# Patient Record
Sex: MALE | Race: WHITE | Employment: OTHER | URBAN - METROPOLITAN AREA
[De-identification: names, ages, dates, MRNs, and addresses within clinical notes are randomized per-mention and may not be internally consistent; named-entity substitution may affect disease eponyms.]

---

## 2018-03-23 ENCOUNTER — APPOINTMENT (OUTPATIENT)
Dept: OTHER | Facility: HOSPITAL | Age: 63
End: 2018-03-23
Attending: ORTHOPAEDIC SURGERY

## 2019-03-06 ENCOUNTER — OFFICE VISIT (OUTPATIENT)
Dept: FAMILY MEDICINE CLINIC | Facility: CLINIC | Age: 64
End: 2019-03-06
Payer: COMMERCIAL

## 2019-03-06 VITALS
DIASTOLIC BLOOD PRESSURE: 80 MMHG | SYSTOLIC BLOOD PRESSURE: 152 MMHG | BODY MASS INDEX: 28.14 KG/M2 | HEIGHT: 71 IN | OXYGEN SATURATION: 97 % | WEIGHT: 201 LBS | HEART RATE: 78 BPM

## 2019-03-06 DIAGNOSIS — Z01.89 ENCOUNTER FOR ROUTINE LABORATORY TESTING: ICD-10-CM

## 2019-03-06 DIAGNOSIS — Z12.5 ENCOUNTER FOR PROSTATE CANCER SCREENING: ICD-10-CM

## 2019-03-06 DIAGNOSIS — I10 ESSENTIAL HYPERTENSION: Primary | ICD-10-CM

## 2019-03-06 PROCEDURE — 99202 OFFICE O/P NEW SF 15 MIN: CPT

## 2019-03-06 RX ORDER — LISINOPRIL 10 MG/1
10 TABLET ORAL DAILY
Qty: 30 TABLET | Refills: 0 | Status: SHIPPED | OUTPATIENT
Start: 2019-03-06 | End: 2019-04-04

## 2019-03-08 PROBLEM — I10 ESSENTIAL HYPERTENSION: Status: ACTIVE | Noted: 2019-03-08

## 2019-03-09 NOTE — PATIENT INSTRUCTIONS
Uncontrolled High Blood Pressure (Established)    Your blood pressure was unusually high today. This can occur if you’ve missed doses of your blood pressure medicine. Or it can happen if you are taking other medicines.  These include some asthma inhalers, It’s important to take steps to lower your blood pressure. If you are taking blood pressure medicine, the guidelines below may help you need less or no medicines in the future. · Start a weight-loss program if you are overweight.   · Cut back on the amount · Consider buying an automatic blood pressure machine. Your provider may recommend a certain type. You can get one of these at most pharmacies. Measure your blood pressure twice a day, in the morning, and in the late afternoon.  Keep a written record of you Regular visits to your own healthcare provider for blood pressure and medicine checks are an important part of your care. Make a follow-up appointment as directed. Bring the record of your home blood pressure readings to the appointment.   When to seek medi

## 2019-03-09 NOTE — PROGRESS NOTES
HPI:    Patient ID: David Paris is a 70year old male. Blood Pressure   This is a new problem. The current episode started more than 1 year ago. The problem occurs intermittently. The problem has been waxing and waning.  Pertinent negatives include Effort normal and breath sounds normal. No respiratory distress. Neurological: He is alert and oriented to person, place, and time. Skin: Skin is warm. No rash noted. Nursing note and vitals reviewed. ASSESSMENT/PLAN:   1.  Essential hyper

## 2019-04-03 ENCOUNTER — APPOINTMENT (OUTPATIENT)
Dept: LAB | Facility: HOSPITAL | Age: 64
End: 2019-04-03
Payer: COMMERCIAL

## 2019-04-03 DIAGNOSIS — Z12.5 ENCOUNTER FOR PROSTATE CANCER SCREENING: ICD-10-CM

## 2019-04-03 DIAGNOSIS — Z01.89 ENCOUNTER FOR ROUTINE LABORATORY TESTING: ICD-10-CM

## 2019-04-03 PROCEDURE — 85027 COMPLETE CBC AUTOMATED: CPT

## 2019-04-03 PROCEDURE — 81003 URINALYSIS AUTO W/O SCOPE: CPT

## 2019-04-03 PROCEDURE — 80053 COMPREHEN METABOLIC PANEL: CPT

## 2019-04-03 PROCEDURE — 80061 LIPID PANEL: CPT

## 2019-04-03 PROCEDURE — 36415 COLL VENOUS BLD VENIPUNCTURE: CPT

## 2019-04-04 ENCOUNTER — OFFICE VISIT (OUTPATIENT)
Dept: FAMILY MEDICINE CLINIC | Facility: CLINIC | Age: 64
End: 2019-04-04
Payer: COMMERCIAL

## 2019-04-04 VITALS
WEIGHT: 205 LBS | OXYGEN SATURATION: 94 % | SYSTOLIC BLOOD PRESSURE: 146 MMHG | DIASTOLIC BLOOD PRESSURE: 82 MMHG | HEART RATE: 75 BPM | BODY MASS INDEX: 28.7 KG/M2 | RESPIRATION RATE: 18 BRPM | HEIGHT: 71 IN

## 2019-04-04 DIAGNOSIS — I10 ESSENTIAL HYPERTENSION: ICD-10-CM

## 2019-04-04 DIAGNOSIS — Z12.11 ENCOUNTER FOR SCREENING COLONOSCOPY: ICD-10-CM

## 2019-04-04 DIAGNOSIS — D69.6 THROMBOCYTOPENIA (HCC): ICD-10-CM

## 2019-04-04 DIAGNOSIS — E66.3 OVERWEIGHT (BMI 25.0-29.9): ICD-10-CM

## 2019-04-04 DIAGNOSIS — E78.00 HYPERCHOLESTEREMIA: ICD-10-CM

## 2019-04-04 DIAGNOSIS — Z00.01 ENCOUNTER FOR ROUTINE ADULT HEALTH EXAMINATION WITH ABNORMAL FINDINGS: Primary | ICD-10-CM

## 2019-04-04 DIAGNOSIS — R97.20 ELEVATED PSA MEASUREMENT: ICD-10-CM

## 2019-04-04 DIAGNOSIS — Z13.31 DEPRESSION SCREENING: ICD-10-CM

## 2019-04-04 PROCEDURE — 99397 PER PM REEVAL EST PAT 65+ YR: CPT

## 2019-04-04 RX ORDER — LISINOPRIL 20 MG/1
20 TABLET ORAL DAILY
Qty: 30 TABLET | Refills: 0 | Status: SHIPPED | OUTPATIENT
Start: 2019-04-04 | End: 2019-05-03

## 2019-04-05 NOTE — PROGRESS NOTES
CC: Annual Physical Exam    HPI:   Evelia Melendez is a 70year old male who presents for a complete physical exam. Pt denies any acute complaints at this time.     Wt Readings from Last 6 Encounters:  04/04/19 : 205 lb  03/06/19 : 201 lb    Body mass inde 8.0    Protein Urine Negative Negative mg/dL    Glucose Urine Negative Negative mg/dL    Ketones Urine Negative Negative mg/dL    Bilirubin Urine Negative Negative    Blood Urine Negative Negative    Nitrite Urine Negative Negative    Urobilinogen Urine <2 extremities,change in bowel or bladder control. HEMATOLOGIC:  Denies anemia, bleeding or bruising. LYMPHATICS:  Denies enlarged nodes or history of splenectomy. PSYCHIATRIC:  Denies depression or anxiety.   ENDOCRINOLOGIC:  Denies excessive sweating, col maintenance, will check: Orders Placed This Encounter      CBC, Platelet, No Differential [E]      Comp Metabolic Panel (14) [E]      Lipid Panel [E]      1. Encounter for routine adult health examination with abnormal findings  - Pt reassured and all ques

## 2019-04-06 PROBLEM — H91.90 HEARING LOSS: Status: ACTIVE | Noted: 2019-04-05

## 2019-04-30 DIAGNOSIS — I10 ESSENTIAL HYPERTENSION: ICD-10-CM

## 2019-04-30 RX ORDER — LISINOPRIL 20 MG/1
TABLET ORAL
Qty: 30 TABLET | Refills: 0 | OUTPATIENT
Start: 2019-04-30

## 2019-05-03 ENCOUNTER — OFFICE VISIT (OUTPATIENT)
Dept: FAMILY MEDICINE CLINIC | Facility: CLINIC | Age: 64
End: 2019-05-03
Payer: COMMERCIAL

## 2019-05-03 VITALS
WEIGHT: 203 LBS | HEART RATE: 64 BPM | BODY MASS INDEX: 28 KG/M2 | OXYGEN SATURATION: 95 % | SYSTOLIC BLOOD PRESSURE: 130 MMHG | DIASTOLIC BLOOD PRESSURE: 70 MMHG

## 2019-05-03 DIAGNOSIS — I10 ESSENTIAL HYPERTENSION: Primary | ICD-10-CM

## 2019-05-03 PROBLEM — Z13.31 DEPRESSION SCREENING: Status: RESOLVED | Noted: 2019-04-04 | Resolved: 2019-05-03

## 2019-05-03 PROBLEM — Z00.01 ENCOUNTER FOR ROUTINE ADULT HEALTH EXAMINATION WITH ABNORMAL FINDINGS: Status: RESOLVED | Noted: 2019-04-04 | Resolved: 2019-05-03

## 2019-05-03 PROCEDURE — 99213 OFFICE O/P EST LOW 20 MIN: CPT

## 2019-05-03 RX ORDER — LISINOPRIL 20 MG/1
20 TABLET ORAL DAILY
Qty: 90 TABLET | Refills: 0 | Status: SHIPPED | OUTPATIENT
Start: 2019-05-03 | End: 2019-08-02

## 2019-05-03 NOTE — PATIENT INSTRUCTIONS
Step-by-Step  Checking Your Blood Pressure    Date Last Reviewed: 4/27/2016  © 9122-1710 [employer]. 140[address] [facility], [address] [place] [address]. All rights reserved.  This information is not intended as a substitute for professional medical

## 2019-05-03 NOTE — PROGRESS NOTES
HPI:    Patient ID: Evelia Melendez is a 75year old male. Hypertension   This is a new problem. Episode onset: 2 months ago. The problem has been gradually improving since onset. The problem is controlled.  Pertinent negatives include no anxiety, blurr heart sounds. Pulmonary/Chest: Effort normal and breath sounds normal. No respiratory distress. Neurological: He is alert and oriented to person, place, and time. Skin: Skin is warm. No rash noted. Nursing note and vitals reviewed.              ASSE

## 2019-06-28 ENCOUNTER — OFFICE VISIT (OUTPATIENT)
Dept: SURGERY | Facility: CLINIC | Age: 64
End: 2019-06-28
Payer: COMMERCIAL

## 2019-06-28 VITALS
HEART RATE: 94 BPM | DIASTOLIC BLOOD PRESSURE: 84 MMHG | WEIGHT: 200 LBS | HEIGHT: 71 IN | SYSTOLIC BLOOD PRESSURE: 136 MMHG | RESPIRATION RATE: 16 BRPM | BODY MASS INDEX: 28 KG/M2

## 2019-06-28 DIAGNOSIS — N40.1 BENIGN PROSTATIC HYPERPLASIA WITH WEAK URINARY STREAM: ICD-10-CM

## 2019-06-28 DIAGNOSIS — R97.20 ELEVATED PSA: Primary | ICD-10-CM

## 2019-06-28 DIAGNOSIS — R39.12 BENIGN PROSTATIC HYPERPLASIA WITH WEAK URINARY STREAM: ICD-10-CM

## 2019-06-28 DIAGNOSIS — R39.12 WEAK URINARY STREAM: ICD-10-CM

## 2019-06-28 PROCEDURE — 99244 OFF/OP CNSLTJ NEW/EST MOD 40: CPT | Performed by: UROLOGY

## 2019-06-28 PROCEDURE — 99212 OFFICE O/P EST SF 10 MIN: CPT | Performed by: UROLOGY

## 2019-06-28 NOTE — PROGRESS NOTES
Amanda Roldan is a 75year old male. HPI:   Patient presents with:  elevated psa    History provided by patient. Referred by Dr. Bettyjo Bamberger. Elevated PSA  Problem started 4/3/2019 with PSA of 4.46.  Patient denies associated symptoms to suggest Medications (Active prior to today's visit):    Current Outpatient Medications:  lisinopril 20 MG Oral Tab Take 1 tablet (20 mg total) by mouth daily.  Disp: 90 tablet Rfl: 0       Allergies:  No Known Allergies      ROS:   Genitourinary:  Negative for Non-tender  BLADDER  Benign  Normal secondary sexual characteristics and normal pubic hair distribution. INGUINAL No hernias  TESTES  =  descended bilaterally without nodules and no orchitis or epididymitis.     EPIDIDYMIS  Unremarkable     SCROTUM Unrem Discussed starting long term dutasteride/Avodart or finasteride vs greenlight laser ablation of prostate under general anesthesia vs observation with patient including risks, benefits, and alternatives.  He indicates his understanding and decides to observe ordered in this encounter       Imaging & Referrals:  None     6/28/2019  Lloyd Chen        By signing my name below, I, Lloyd Chen,  attest that this documentation has been prepared under the direction and in the presence of Manny Ren

## 2019-06-28 NOTE — PATIENT INSTRUCTIONS
Mei Reece M.D.      1.  Visit in August 2019. Please get blood draw for PSA--total and free 1--10 days before visit. No sexual stimulation whatsoever for 5 days before the actual PSA blood draw.       2.   The heart hea

## 2019-06-29 PROBLEM — N40.0 BENIGN PROSTATIC HYPERPLASIA WITH ELEVATED PROSTATE SPECIFIC ANTIGEN (PSA): Status: ACTIVE | Noted: 2019-04-04

## 2019-06-29 PROBLEM — R39.12 BENIGN PROSTATIC HYPERPLASIA WITH WEAK URINARY STREAM: Status: ACTIVE | Noted: 2019-06-28

## 2019-06-29 PROBLEM — N40.1 BENIGN PROSTATIC HYPERPLASIA WITH WEAK URINARY STREAM: Status: ACTIVE | Noted: 2019-06-28

## 2019-06-29 PROBLEM — R97.20 BENIGN PROSTATIC HYPERPLASIA WITH ELEVATED PROSTATE SPECIFIC ANTIGEN (PSA): Status: ACTIVE | Noted: 2019-04-04

## 2019-07-28 DIAGNOSIS — I10 ESSENTIAL HYPERTENSION: ICD-10-CM

## 2019-07-29 RX ORDER — LISINOPRIL 20 MG/1
TABLET ORAL
Qty: 90 TABLET | Refills: 0 | OUTPATIENT
Start: 2019-07-29

## 2019-08-02 ENCOUNTER — OFFICE VISIT (OUTPATIENT)
Dept: FAMILY MEDICINE CLINIC | Facility: CLINIC | Age: 64
End: 2019-08-02
Payer: COMMERCIAL

## 2019-08-02 VITALS
WEIGHT: 199 LBS | BODY MASS INDEX: 27.86 KG/M2 | OXYGEN SATURATION: 98 % | DIASTOLIC BLOOD PRESSURE: 84 MMHG | SYSTOLIC BLOOD PRESSURE: 168 MMHG | HEART RATE: 82 BPM | HEIGHT: 71 IN

## 2019-08-02 DIAGNOSIS — I10 ESSENTIAL HYPERTENSION: Primary | ICD-10-CM

## 2019-08-02 PROCEDURE — 99213 OFFICE O/P EST LOW 20 MIN: CPT

## 2019-08-02 RX ORDER — LISINOPRIL 20 MG/1
20 TABLET ORAL DAILY
Qty: 30 TABLET | Refills: 0 | Status: SHIPPED | OUTPATIENT
Start: 2019-08-02 | End: 2019-08-28

## 2019-08-03 NOTE — PROGRESS NOTES
HPI:    Patient ID: Yu Kyle is a 75year old male. Hypertension   This is a chronic problem. Episode onset: few months ago. The problem has been waxing and waning since onset. The problem is uncontrolled.  Pertinent negatives include no anxiety, Pulmonary/Chest: Effort normal and breath sounds normal. No respiratory distress. Neurological: He is alert and oriented to person, place, and time. Skin: Skin is warm. No rash noted. Nursing note and vitals reviewed.              ASSESSMENT/PLAN:

## 2019-08-20 ENCOUNTER — LAB ENCOUNTER (OUTPATIENT)
Dept: LAB | Facility: HOSPITAL | Age: 64
End: 2019-08-20
Payer: COMMERCIAL

## 2019-08-20 DIAGNOSIS — E78.00 HYPERCHOLESTEREMIA: ICD-10-CM

## 2019-08-20 DIAGNOSIS — D69.6 THROMBOCYTOPENIA (HCC): ICD-10-CM

## 2019-08-20 DIAGNOSIS — R97.20 ELEVATED PSA: ICD-10-CM

## 2019-08-20 LAB
ALBUMIN SERPL-MCNC: 4.2 G/DL (ref 3.4–5)
ALBUMIN/GLOB SERPL: 1.2 {RATIO} (ref 1–2)
ALP LIVER SERPL-CCNC: 73 U/L (ref 45–117)
ALT SERPL-CCNC: 63 U/L (ref 16–61)
ANION GAP SERPL CALC-SCNC: 5 MMOL/L (ref 0–18)
AST SERPL-CCNC: 40 U/L (ref 15–37)
BILIRUB SERPL-MCNC: 0.9 MG/DL (ref 0.1–2)
BUN BLD-MCNC: 15 MG/DL (ref 7–18)
BUN/CREAT SERPL: 14.3 (ref 10–20)
CALCIUM BLD-MCNC: 9.4 MG/DL (ref 8.5–10.1)
CHLORIDE SERPL-SCNC: 108 MMOL/L (ref 98–112)
CHOLEST SMN-MCNC: 249 MG/DL (ref ?–200)
CO2 SERPL-SCNC: 27 MMOL/L (ref 21–32)
CREAT BLD-MCNC: 1.05 MG/DL (ref 0.7–1.3)
DEPRECATED RDW RBC AUTO: 46.3 FL (ref 35.1–46.3)
ERYTHROCYTE [DISTWIDTH] IN BLOOD BY AUTOMATED COUNT: 12.4 % (ref 11–15)
GLOBULIN PLAS-MCNC: 3.6 G/DL (ref 2.8–4.4)
GLUCOSE BLD-MCNC: 97 MG/DL (ref 70–99)
HCT VFR BLD AUTO: 41.8 % (ref 39–53)
HDLC SERPL-MCNC: 81 MG/DL (ref 40–59)
HGB BLD-MCNC: 13.6 G/DL (ref 13–17.5)
LDLC SERPL CALC-MCNC: 147 MG/DL (ref ?–100)
M PROTEIN MFR SERPL ELPH: 7.8 G/DL (ref 6.4–8.2)
MCH RBC QN AUTO: 32.7 PG (ref 26–34)
MCHC RBC AUTO-ENTMCNC: 32.5 G/DL (ref 31–37)
MCV RBC AUTO: 100.5 FL (ref 80–100)
NONHDLC SERPL-MCNC: 168 MG/DL (ref ?–130)
OSMOLALITY SERPL CALC.SUM OF ELEC: 291 MOSM/KG (ref 275–295)
PATIENT FASTING: YES
PATIENT FASTING: YES
PLATELET # BLD AUTO: 159 10(3)UL (ref 150–450)
POTASSIUM SERPL-SCNC: 4.6 MMOL/L (ref 3.5–5.1)
PSA FREE MFR SERPL: 9 %
PSA FREE SERPL-MCNC: 0.52 NG/ML
PSA SERPL-MCNC: 5.81 NG/ML (ref ?–4)
RBC # BLD AUTO: 4.16 X10(6)UL (ref 3.8–5.8)
SODIUM SERPL-SCNC: 140 MMOL/L (ref 136–145)
TRIGL SERPL-MCNC: 105 MG/DL (ref 30–149)
VLDLC SERPL CALC-MCNC: 21 MG/DL (ref 0–30)
WBC # BLD AUTO: 5.1 X10(3) UL (ref 4–11)

## 2019-08-20 PROCEDURE — 80061 LIPID PANEL: CPT

## 2019-08-20 PROCEDURE — 85027 COMPLETE CBC AUTOMATED: CPT

## 2019-08-20 PROCEDURE — 84153 ASSAY OF PSA TOTAL: CPT

## 2019-08-20 PROCEDURE — 36415 COLL VENOUS BLD VENIPUNCTURE: CPT

## 2019-08-20 PROCEDURE — 84154 ASSAY OF PSA FREE: CPT

## 2019-08-20 PROCEDURE — 80053 COMPREHEN METABOLIC PANEL: CPT

## 2019-08-23 ENCOUNTER — OFFICE VISIT (OUTPATIENT)
Dept: SURGERY | Facility: CLINIC | Age: 64
End: 2019-08-23
Payer: COMMERCIAL

## 2019-08-23 VITALS
HEIGHT: 71 IN | WEIGHT: 198 LBS | RESPIRATION RATE: 17 BRPM | HEART RATE: 73 BPM | DIASTOLIC BLOOD PRESSURE: 90 MMHG | TEMPERATURE: 98 F | SYSTOLIC BLOOD PRESSURE: 177 MMHG | BODY MASS INDEX: 27.72 KG/M2

## 2019-08-23 DIAGNOSIS — R97.20 ELEVATED PSA: Primary | ICD-10-CM

## 2019-08-23 DIAGNOSIS — R39.12 BENIGN PROSTATIC HYPERPLASIA WITH WEAK URINARY STREAM: ICD-10-CM

## 2019-08-23 DIAGNOSIS — R39.12 WEAK URINARY STREAM: ICD-10-CM

## 2019-08-23 DIAGNOSIS — N40.1 BENIGN PROSTATIC HYPERPLASIA WITH WEAK URINARY STREAM: ICD-10-CM

## 2019-08-23 PROCEDURE — 99214 OFFICE O/P EST MOD 30 MIN: CPT | Performed by: UROLOGY

## 2019-08-23 RX ORDER — CIPROFLOXACIN 500 MG/1
TABLET, FILM COATED ORAL
Qty: 6 TABLET | Refills: 0 | Status: SHIPPED | OUTPATIENT
Start: 2019-08-23 | End: 2019-08-28 | Stop reason: ALTCHOICE

## 2019-08-23 RX ORDER — CEFDINIR 300 MG/1
300 CAPSULE ORAL EVERY 12 HOURS
Qty: 6 CAPSULE | Refills: 0 | Status: SHIPPED | OUTPATIENT
Start: 2019-08-23 | End: 2019-08-26

## 2019-08-23 NOTE — PROGRESS NOTES
HPI:    Patient ID: Kath Patel is a 75year old male. HPI  Elevated PSA  Chronic. Problem started 4/3/2019 with PSA of 4.46.  Patient denies associated symptoms to suggest prostate cancer such as weight loss or loss of appetite or bone pain and den Negative for chest tightness and shortness of breath. Cardiovascular: Negative for chest pain. Gastrointestinal: Negative for abdominal pain and constipation. Genitourinary: Negative for dysuria, flank pain and hematuria (Gross).         Positive for month, how many times per night did you most typically get up to urinate from the time you went to bed at night until the time you got up in the morning?: Not at all  Total Symptom Score: 1  If you were to spend the rest of your life with your urinary cond right side. In light of the available information, I fully explained to patient the benefits, risks, complications, side effects, reasons for, nature of, alternatives of trans-rectal ultrasound guided prostate biopsy vs re-evaluation with repeat PSA.  I ans start  IN THE MORNING ON THE DAY BEFORE THE BIOPSY and continue it until you finish it    7. You may eat breakfast and lunch on the day of the biopsy because the biopsy will be performed under local anesthesia and you are not being put to sleep.       No o

## 2019-08-23 NOTE — PATIENT INSTRUCTIONS
Lily Grady M.D.      1.  Proceed with plans for ultrasound-guided needle biopsy of prostate in the office    2.    Stop aspirin 10  days before biopsy and NSAIDs such as Motrin, Advil, Aleve, naproxen, ibuprofen  7   days

## 2019-08-28 ENCOUNTER — OFFICE VISIT (OUTPATIENT)
Dept: FAMILY MEDICINE CLINIC | Facility: CLINIC | Age: 64
End: 2019-08-28
Payer: COMMERCIAL

## 2019-08-28 VITALS
BODY MASS INDEX: 28 KG/M2 | DIASTOLIC BLOOD PRESSURE: 72 MMHG | OXYGEN SATURATION: 96 % | SYSTOLIC BLOOD PRESSURE: 144 MMHG | HEART RATE: 72 BPM | WEIGHT: 203 LBS

## 2019-08-28 DIAGNOSIS — I10 ESSENTIAL HYPERTENSION: ICD-10-CM

## 2019-08-28 PROCEDURE — 99213 OFFICE O/P EST LOW 20 MIN: CPT

## 2019-08-28 RX ORDER — LISINOPRIL 30 MG/1
30 TABLET ORAL DAILY
Qty: 30 TABLET | Refills: 0 | Status: SHIPPED | OUTPATIENT
Start: 2019-08-28 | End: 2019-09-23

## 2019-08-30 NOTE — PROGRESS NOTES
HPI:    Patient ID: Bessy Patel is a 75year old male. Hypertension   This is a chronic problem. Episode onset: few months ago. The problem has been waxing and waning since onset. The problem is uncontrolled.  Pertinent negatives include no anxiety, normal and breath sounds normal. No respiratory distress. Neurological: He is alert and oriented to person, place, and time. Skin: Skin is warm. No rash noted. Nursing note and vitals reviewed. ASSESSMENT/PLAN:   1.  Essential hypertension

## 2019-08-30 NOTE — PATIENT INSTRUCTIONS
Exercise for a Healthier Heart     Exercise with a friend. When activity is fun, you're more likely to stick with it. You may wonder how you can improve the health of your heart. If you’re thinking about exercise, you’re on the right track.  You don’t n Choose one or more activities you enjoy. Walking is one of the easiest things you can do. You can also try swimming, riding a bike, dancing, or taking an exercise class.   Stop exercising and call your doctor if you:  · Have chest pain or feel dizzy or ligh

## 2019-09-23 ENCOUNTER — OFFICE VISIT (OUTPATIENT)
Dept: FAMILY MEDICINE CLINIC | Facility: CLINIC | Age: 64
End: 2019-09-23
Payer: COMMERCIAL

## 2019-09-23 VITALS
SYSTOLIC BLOOD PRESSURE: 134 MMHG | WEIGHT: 198 LBS | HEART RATE: 68 BPM | BODY MASS INDEX: 28 KG/M2 | DIASTOLIC BLOOD PRESSURE: 74 MMHG | OXYGEN SATURATION: 96 %

## 2019-09-23 DIAGNOSIS — I10 ESSENTIAL HYPERTENSION: Primary | ICD-10-CM

## 2019-09-23 DIAGNOSIS — I10 ESSENTIAL HYPERTENSION: ICD-10-CM

## 2019-09-23 DIAGNOSIS — E78.00 HYPERCHOLESTEREMIA: ICD-10-CM

## 2019-09-23 PROCEDURE — 99214 OFFICE O/P EST MOD 30 MIN: CPT

## 2019-09-23 RX ORDER — SIMVASTATIN 20 MG
20 TABLET ORAL NIGHTLY
Qty: 90 TABLET | Refills: 0 | Status: SHIPPED | OUTPATIENT
Start: 2019-09-23 | End: 2019-12-09

## 2019-09-23 RX ORDER — LISINOPRIL 30 MG/1
30 TABLET ORAL DAILY
Qty: 90 TABLET | Refills: 0 | Status: SHIPPED | OUTPATIENT
Start: 2019-09-23 | End: 2019-12-09

## 2019-09-23 NOTE — PATIENT INSTRUCTIONS
Lifestyle Changes to Control Cholesterol  You can control your cholesterol through diet, exercise, weight management, quitting smoking, stress management, and taking your medicines right. These things can also lower your risk for cardiovascular disease. · Riding a bicycle or stationary bike  · Dancing  Managing your weight  If you are overweight or obese, your healthcare provider will work with you to help you lose weight and lower your BMI (body mass index).  Making diet changes and getting more physical · Don’t skip a dose or stop taking your medicine because you feel better or because your cholesterol numbers go down. Never stop taking your medicine unless your healthcare provider has told you it’s OK.   · Ask your healthcare provider if you have any ques © 2742-0639 [employer]. 140[address] [facility] [place], [facility] [place]. All rights reserved. This information is not intended as a substitute for professional medical care. Always follow your healthcare professional's instructions.

## 2019-09-23 NOTE — PROGRESS NOTES
HPI:    Patient ID: Kalli Yeboah is a 75year old male. Hypertension   This is a chronic problem. Episode onset: few months ago. The problem has been waxing and waning since onset. The problem is controlled.  Pertinent negatives include no anxiety, b weakness, syncope, weakness, light-headedness, numbness and headaches. All other systems reviewed and are negative. Current Outpatient Medications:  simvastatin 20 MG Oral Tab Take 1 tablet (20 mg total) by mouth nightly.  Disp: 90 tablet Rfl:

## 2019-09-25 RX ORDER — LISINOPRIL 30 MG/1
TABLET ORAL
Qty: 30 TABLET | Refills: 0 | OUTPATIENT
Start: 2019-09-25

## 2019-09-30 ENCOUNTER — OFFICE VISIT (OUTPATIENT)
Dept: SURGERY | Facility: CLINIC | Age: 64
End: 2019-09-30
Payer: COMMERCIAL

## 2019-09-30 VITALS
BODY MASS INDEX: 27.72 KG/M2 | HEIGHT: 71 IN | HEART RATE: 65 BPM | RESPIRATION RATE: 16 BRPM | WEIGHT: 198 LBS | TEMPERATURE: 99 F | DIASTOLIC BLOOD PRESSURE: 80 MMHG | SYSTOLIC BLOOD PRESSURE: 126 MMHG

## 2019-09-30 DIAGNOSIS — R97.20 ELEVATED PROSTATE SPECIFIC ANTIGEN (PSA): ICD-10-CM

## 2019-09-30 DIAGNOSIS — R97.20 ELEVATED PSA: Primary | ICD-10-CM

## 2019-09-30 PROCEDURE — 76872 US TRANSRECTAL: CPT | Performed by: UROLOGY

## 2019-09-30 PROCEDURE — 55700 BIOPSY OF PROSTATE,NEEDLE/PUNCH: CPT | Performed by: UROLOGY

## 2019-09-30 NOTE — PROGRESS NOTES
26 Jing Garcia Patient Status:  Outpatient    1955 MRN [id number]   Location [address] [place] [address] Attending [facility] Day # 0 PCP MD Kaykay Ballesteros is a 75 y under ultrasound guidance.  Using a 7\"22 gauge spinal needle and using ultrasound guidance, I injected  1% xylocaine solution in each of the following 4 locations: Junction of the prostate and seminal vesicle at the right base; junction of the prostate and evening. If you do not hear from us within 5-6 days, please call office and leave message that you are calling for results.   I will be away from the office for 13 days; our nurse practitioner Zuly Thakkar and urologist Dr. Naima Villafuerte and Dr. April Rizo  mark

## 2019-09-30 NOTE — PATIENT INSTRUCTIONS
Rahda Mora M.D.      1. Finish your prescribed 3 day course of the 2 different antibiotics as directed. 2. No heavy lifting or strenuous physical activity for a few days. 3. No aspirin or NSAIDs for 24 hours.     4

## 2019-10-02 ENCOUNTER — TELEPHONE (OUTPATIENT)
Dept: SURGERY | Facility: CLINIC | Age: 64
End: 2019-10-02

## 2019-10-02 NOTE — TELEPHONE ENCOUNTER
Patient called back. I discussed his pathology results with him. He verbalized understanding and was thankful. I recommended follow up with SILVINA in 4 weeks. He was agreeable.     Staff, can we please schedule this patient for a cancer discussion with SILVINA

## 2019-10-02 NOTE — TELEPHONE ENCOUNTER
Attempted to call patient to discuss prostate biopsy done by ALLISON PHILIPPE. If calls back please transfer to [id number].

## 2019-10-07 NOTE — TELEPHONE ENCOUNTER
Called and LVM for patient to call us back to schedule appointment with PVK. Patient tentatively scheduled with PVK on 11/7/19 at 1:40 pm for 40 minutes.  ANILA's when patient call us back please offer 11/7/19 at 1:40 pm.

## 2019-10-13 NOTE — TELEPHONE ENCOUNTER
Johnnie Check and Andie Cesar,  This patient positive prostate cancer pathology; to address this, I need to see him this month instead of November 7; please try to get him in to see me on Thursday this month if no other way.   Likewise, please notify patient th

## 2019-10-16 NOTE — TELEPHONE ENCOUNTER
TERESATCB. I placed pt on the schd for PVK for a 40 min cancer disc. On Thurs. 10/24 at 11:20 am. When pt calls back please confirm that he can accept that appt and if not send us back a msg.

## 2019-10-23 ENCOUNTER — TELEPHONE (OUTPATIENT)
Dept: SURGERY | Facility: CLINIC | Age: 64
End: 2019-10-23

## 2019-10-23 NOTE — TELEPHONE ENCOUNTER
I was asked by my supervisor to call pt and ask him if there was any way he would be able to come in at 4 pm for the cancer discussion since we may not be able to find another 40 min slot for a while.      I called pt and asked him if there was any way poss

## 2019-10-23 NOTE — TELEPHONE ENCOUNTER
Pt called stating pt is scheduled for appointment 10-24-19 at 11:20am for cancer discussion. Pt received a message appointment time will need to be changed to 4:00pm. Pt has to work. Pt is unavailable.   Pt is available before 2:45 pm.   Please call pt t

## 2019-10-24 NOTE — TELEPHONE ENCOUNTER
Pt called back and states he wont be able to come in the office today at 4pm. Pt requesting to speak with RN in regards to a different appt.

## 2019-10-24 NOTE — TELEPHONE ENCOUNTER
Spoke with pt and informed him that we had a cxl appt for Monday 10/28 at 1:20 pm and asked if he could take that appt and pt agreed and was very thankful. I placed him on the schd.

## 2019-10-28 ENCOUNTER — OFFICE VISIT (OUTPATIENT)
Dept: SURGERY | Facility: CLINIC | Age: 64
End: 2019-10-28
Payer: MEDICARE

## 2019-10-28 VITALS
SYSTOLIC BLOOD PRESSURE: 155 MMHG | HEIGHT: 71 IN | HEART RATE: 60 BPM | WEIGHT: 190 LBS | TEMPERATURE: 98 F | BODY MASS INDEX: 26.6 KG/M2 | RESPIRATION RATE: 16 BRPM | DIASTOLIC BLOOD PRESSURE: 85 MMHG

## 2019-10-28 DIAGNOSIS — R39.12 BENIGN PROSTATIC HYPERPLASIA WITH WEAK URINARY STREAM: ICD-10-CM

## 2019-10-28 DIAGNOSIS — C61 PROSTATE CANCER (HCC): Primary | ICD-10-CM

## 2019-10-28 DIAGNOSIS — R35.1 NOCTURIA: ICD-10-CM

## 2019-10-28 DIAGNOSIS — N40.1 BENIGN PROSTATIC HYPERPLASIA WITH WEAK URINARY STREAM: ICD-10-CM

## 2019-10-28 PROCEDURE — 99214 OFFICE O/P EST MOD 30 MIN: CPT | Performed by: UROLOGY

## 2019-10-28 PROCEDURE — 99354 PROLONGED SERV,OFFICE,1ST HR: CPT | Performed by: UROLOGY

## 2019-10-28 PROCEDURE — G0463 HOSPITAL OUTPT CLINIC VISIT: HCPCS | Performed by: UROLOGY

## 2019-10-28 NOTE — PATIENT INSTRUCTIONS
Elvis Honeycutt M.D.      1.   Please schedule 3 T MRI of the prostate--to reconfirm that there is no evidence of spread of cancer outside your prostate; as of today, please wait at least 2 weeks before you have the 3T MRI per

## 2019-10-28 NOTE — PROGRESS NOTES
Urology visit                                     75 year-old male comes to the office by himself. He wants to be evaluated for ---    1. Newly diagnosed prostate cancer  2. Voiding difficulties  3. Enlarged prostate  4. Erectile dysfunction  5.    m smoked for 8-10 years with a pack or less a day--8 year pack history. Patient is a  and has been exposed to fumes for this. Family History  His father had an unspecified cancer and congestive heart failure, he  at 80.  His mother has breast [place] score 3+4=7 (Grade Group 2), involving one out of three tissue cores and approximately 10% of entire specimen. Perineural invasion is not identified.  Other regions negative      LABORATORIES    8/20/2019 PSA = 5.81, 9% free PSA (58% probability o possible fatal complications, possibility of a heart attack or stroke), complications, side effects, reasons for, nature of, alternatives to the open or robotic radical prostatectomy, ultrasound guided placement of radioactive implants into the prostate, s associate Dr. Angel Weinberg to further explore possibility of robotic radical prostatectomy; ideally see him after the MRI but you could also make an appointment to see him before the MRI    3.    If you would like another opinion on external beam radiati

## 2019-11-01 ENCOUNTER — TELEPHONE (OUTPATIENT)
Dept: SURGERY | Facility: CLINIC | Age: 64
End: 2019-11-01

## 2019-11-01 NOTE — TELEPHONE ENCOUNTER
Per Chris Reddy, calling to request clarification on order of MRI Prostate. Asking if test needs to be done two weeks from order date or two weeks from biopsy?

## 2019-11-15 ENCOUNTER — HOSPITAL ENCOUNTER (OUTPATIENT)
Dept: MRI IMAGING | Facility: HOSPITAL | Age: 64
Discharge: HOME | End: 2019-11-15
Attending: UROLOGY
Payer: MEDICARE

## 2019-11-15 DIAGNOSIS — C61 PROSTATE CANCER (HCC): ICD-10-CM

## 2019-11-15 PROCEDURE — A9575 INJ GADOTERATE MEGLUMI 0.1ML: HCPCS | Performed by: UROLOGY

## 2019-11-15 PROCEDURE — 82565 ASSAY OF CREATININE: CPT

## 2019-11-15 PROCEDURE — 72197 MRI PELVIS W/O & W/DYE: CPT | Performed by: UROLOGY

## 2019-11-17 DIAGNOSIS — C79.51 BONE METASTASIS (HCC): ICD-10-CM

## 2019-11-17 DIAGNOSIS — R93.5 ABNORMAL MRI, PELVIS: ICD-10-CM

## 2019-11-17 DIAGNOSIS — M89.8X5 LYTIC BONE LESION OF HIP: Primary | ICD-10-CM

## 2019-11-17 DIAGNOSIS — C61 PROSTATE CANCER (HCC): ICD-10-CM

## 2019-11-19 ENCOUNTER — TELEPHONE (OUTPATIENT)
Dept: SURGERY | Facility: CLINIC | Age: 64
End: 2019-11-19

## 2019-11-19 DIAGNOSIS — C61 PROSTATE CANCER (HCC): Primary | ICD-10-CM

## 2019-11-19 NOTE — TELEPHONE ENCOUNTER
Detailed message left on pts number (HIPPA verified) informing order for bone scan has been signed. Central scheduling phone number provided as well as office number if any further questions.

## 2019-11-19 NOTE — TELEPHONE ENCOUNTER
Dr. Danette Ying, spoke to pt and relayed your message as shown below regarding MRI of prostate. Noted that Bone scan was not ordered. Pended order for you to review and sign off. I will then call pt informing this order has been placed.  Pt aware he can go ah

## 2019-11-19 NOTE — TELEPHONE ENCOUNTER
----- Message from Colan Homans, MD sent at 11/17/2019 11:54 PM CST -----  Nurses, please call Roula Liu.   MRI of the prostate shows lesion on the right side of the prostate and the lesion is suspicious for cancer; we know from the biopsy that cancer was

## 2019-11-27 ENCOUNTER — HOSPITAL ENCOUNTER (OUTPATIENT)
Dept: GENERAL RADIOLOGY | Facility: HOSPITAL | Age: 64
Discharge: HOME | End: 2019-11-27
Attending: UROLOGY
Payer: MEDICARE

## 2019-11-27 ENCOUNTER — APPOINTMENT (OUTPATIENT)
Dept: LAB | Facility: HOSPITAL | Age: 64
End: 2019-11-27
Attending: UROLOGY
Payer: MEDICARE

## 2019-11-27 ENCOUNTER — HOSPITAL ENCOUNTER (OUTPATIENT)
Dept: NUCLEAR MEDICINE | Facility: HOSPITAL | Age: 64
Discharge: HOME | End: 2019-11-27
Attending: UROLOGY
Payer: MEDICARE

## 2019-11-27 DIAGNOSIS — M25.552 HIP PAIN, ACUTE, LEFT: ICD-10-CM

## 2019-11-27 DIAGNOSIS — C79.51 BONE METASTASIS (HCC): ICD-10-CM

## 2019-11-27 DIAGNOSIS — C61 PROSTATE CANCER (HCC): ICD-10-CM

## 2019-11-27 PROCEDURE — 36415 COLL VENOUS BLD VENIPUNCTURE: CPT

## 2019-11-27 PROCEDURE — 78306 BONE IMAGING WHOLE BODY: CPT | Performed by: UROLOGY

## 2019-11-27 PROCEDURE — 73502 X-RAY EXAM HIP UNI 2-3 VIEWS: CPT | Performed by: UROLOGY

## 2019-11-27 PROCEDURE — 80076 HEPATIC FUNCTION PANEL: CPT

## 2019-12-04 ENCOUNTER — OFFICE VISIT (OUTPATIENT)
Dept: SURGERY | Facility: CLINIC | Age: 64
End: 2019-12-04
Payer: MEDICARE

## 2019-12-04 VITALS
BODY MASS INDEX: 26.6 KG/M2 | TEMPERATURE: 98 F | HEART RATE: 54 BPM | WEIGHT: 190 LBS | DIASTOLIC BLOOD PRESSURE: 88 MMHG | HEIGHT: 71 IN | SYSTOLIC BLOOD PRESSURE: 159 MMHG

## 2019-12-04 DIAGNOSIS — C61 PROSTATE CANCER (HCC): Primary | ICD-10-CM

## 2019-12-04 DIAGNOSIS — Z01.818 PREOP EXAMINATION: ICD-10-CM

## 2019-12-04 DIAGNOSIS — R39.9 SYMPTOM INVOLVING BLADDER: ICD-10-CM

## 2019-12-04 PROCEDURE — G0463 HOSPITAL OUTPT CLINIC VISIT: HCPCS | Performed by: UROLOGY

## 2019-12-04 PROCEDURE — 99214 OFFICE O/P EST MOD 30 MIN: CPT | Performed by: UROLOGY

## 2019-12-04 PROCEDURE — 99354 PROLONGED SERV,OFFICE,1ST HR: CPT | Performed by: UROLOGY

## 2019-12-04 NOTE — PROGRESS NOTES
[facility], [facility] Urologic Oncology  Initial Office Consultation    HPI:   Jeanine Andrade is a 75year old male here today for consultation at the request of, and a copy of this note will be sent to, Suly Márquez MD and Chencho Berg MD regarding an assisted radical prostatectomy for treatment of his prostate cancer. PMH: HTN, HLD, thrombocytopenia, recently diagnosed prostate cancer. PSH: None. FAMILY Hx: No reported family history of  malignancies. Father is  at the age of 80. LABS:    Component PSA, % Free PSA Screen   Latest Ref Rng & Units % <=4.00 ng/mL   8/20/2019 9 5.81 (H)   4/3/2019  4.46 (H)     IMAGING:    NM Bone Scan (11/27/2019):   Whole body planar bone scan demonstrates no definite evidence of osseous metastati prognostic significance of biopsy pathology findings (Chidi score), clinical stage, and serum PSA in predicting both pathologic stage found at surgery as well as long-term outcomes following surgical or radiation therapy.   We discussed how these data can rate and permanent seed implantation.   They understand that specific radiotherapy approaches have not shown equivalent outcomes for all risk groups, and, for example, concerning intermediate or high-risk patients, brachytherapy is generally considered infe medical and anesthetic complications, and adjacent organ involvement or injury.   We discussed the common specific risks of prostatectomy, which include stress urinary incontinence--commonly mild but which can also be severe in rare circumstances; erectile is leaning towards a robotic assisted laparoscopic radical prostatectomy for definitive local treatment of his disease.  A bilateral pelvic lymph node dissection is recommended given his 18% chance of lymph node involvement based on the [facility] pre-radical pr

## 2019-12-04 NOTE — PROGRESS NOTES
Patient seen in office, scheduled for Robotic Assisted Laparoscopic Radical Prostatectomy, Bilateral Pelvic Lymph Node Dissection, possible Open, Monday 12/30/19, [facility], went over pre-op/labs with patient, all questions answered, verbalized und

## 2019-12-05 ENCOUNTER — TELEPHONE (OUTPATIENT)
Dept: FAMILY MEDICINE CLINIC | Facility: CLINIC | Age: 64
End: 2019-12-05

## 2019-12-06 ENCOUNTER — LAB ENCOUNTER (OUTPATIENT)
Dept: LAB | Facility: HOSPITAL | Age: 64
End: 2019-12-06
Attending: UROLOGY
Payer: MEDICARE

## 2019-12-06 ENCOUNTER — TELEPHONE (OUTPATIENT)
Dept: SURGERY | Facility: CLINIC | Age: 64
End: 2019-12-06

## 2019-12-06 ENCOUNTER — APPOINTMENT (OUTPATIENT)
Dept: LAB | Facility: HOSPITAL | Age: 64
End: 2019-12-06
Attending: UROLOGY
Payer: MEDICARE

## 2019-12-06 ENCOUNTER — HOSPITAL ENCOUNTER (OUTPATIENT)
Dept: GENERAL RADIOLOGY | Facility: HOSPITAL | Age: 64
Discharge: HOME | End: 2019-12-06
Attending: UROLOGY
Payer: MEDICARE

## 2019-12-06 DIAGNOSIS — R39.9 SYMPTOM INVOLVING BLADDER: ICD-10-CM

## 2019-12-06 DIAGNOSIS — M89.9 LESION OF PELVIC BONE: ICD-10-CM

## 2019-12-06 DIAGNOSIS — C61 PROSTATE CANCER (HCC): Primary | ICD-10-CM

## 2019-12-06 DIAGNOSIS — E78.00 HYPERCHOLESTEREMIA: ICD-10-CM

## 2019-12-06 DIAGNOSIS — Z01.818 PREOP EXAMINATION: ICD-10-CM

## 2019-12-06 DIAGNOSIS — Z01.811 PRE-OP CHEST EXAM: ICD-10-CM

## 2019-12-06 PROCEDURE — 93010 ELECTROCARDIOGRAM REPORT: CPT | Performed by: UROLOGY

## 2019-12-06 PROCEDURE — 87086 URINE CULTURE/COLONY COUNT: CPT

## 2019-12-06 PROCEDURE — 86850 RBC ANTIBODY SCREEN: CPT

## 2019-12-06 PROCEDURE — 86900 BLOOD TYPING SEROLOGIC ABO: CPT

## 2019-12-06 PROCEDURE — 86901 BLOOD TYPING SEROLOGIC RH(D): CPT

## 2019-12-06 PROCEDURE — 36415 COLL VENOUS BLD VENIPUNCTURE: CPT

## 2019-12-06 PROCEDURE — 93005 ELECTROCARDIOGRAM TRACING: CPT

## 2019-12-06 PROCEDURE — 85025 COMPLETE CBC W/AUTO DIFF WBC: CPT

## 2019-12-06 PROCEDURE — 81003 URINALYSIS AUTO W/O SCOPE: CPT

## 2019-12-06 PROCEDURE — 71046 X-RAY EXAM CHEST 2 VIEWS: CPT | Performed by: UROLOGY

## 2019-12-06 PROCEDURE — 80053 COMPREHEN METABOLIC PANEL: CPT

## 2019-12-06 PROCEDURE — 85610 PROTHROMBIN TIME: CPT

## 2019-12-06 PROCEDURE — 80061 LIPID PANEL: CPT

## 2019-12-06 PROCEDURE — 85730 THROMBOPLASTIN TIME PARTIAL: CPT

## 2019-12-06 NOTE — TELEPHONE ENCOUNTER
Called patient and spoke to him. Informed him that I presented his case at our urologic oncology tumor conference and we discussed the suspicious bone lesion that appeared on his prostate MRI.     I even further discussed the imaging studies with multiple r

## 2019-12-09 ENCOUNTER — TELEPHONE (OUTPATIENT)
Dept: FAMILY MEDICINE CLINIC | Facility: CLINIC | Age: 64
End: 2019-12-09

## 2019-12-09 DIAGNOSIS — C61 PROSTATE CANCER (HCC): Primary | ICD-10-CM

## 2019-12-09 DIAGNOSIS — Z79.01 MONITORING FOR ANTICOAGULANT USE: ICD-10-CM

## 2019-12-09 DIAGNOSIS — E78.00 HYPERCHOLESTEREMIA: ICD-10-CM

## 2019-12-09 DIAGNOSIS — R79.1 ABNORMAL PARTIAL THROMBOPLASTIN TIME (PTT): ICD-10-CM

## 2019-12-09 DIAGNOSIS — Z51.81 MONITORING FOR ANTICOAGULANT USE: ICD-10-CM

## 2019-12-09 DIAGNOSIS — I10 ESSENTIAL HYPERTENSION: ICD-10-CM

## 2019-12-09 RX ORDER — SIMVASTATIN 20 MG
20 TABLET ORAL NIGHTLY
Qty: 10 TABLET | Refills: 0 | Status: SHIPPED | OUTPATIENT
Start: 2019-12-09 | End: 2019-12-17

## 2019-12-09 RX ORDER — LISINOPRIL 30 MG/1
30 TABLET ORAL DAILY
Qty: 10 TABLET | Refills: 0 | Status: SHIPPED | OUTPATIENT
Start: 2019-12-09 | End: 2019-12-17

## 2019-12-09 NOTE — TELEPHONE ENCOUNTER
Refills were authorized for a 10 day supply only, further refills will be done during his next appointment. Patient informed.

## 2019-12-09 NOTE — TELEPHONE ENCOUNTER
Reviewed patient's insurance. He has Medicare with a [employer] supplement. Prior authorization is not required for his CT of the Pelvis. Called and spoke with the patient.  I informed him that no PA is required and transferred him to [address] [place] [address]

## 2019-12-09 NOTE — TELEPHONE ENCOUNTER
Pt called to request partial refills for simvastatin 20 MG Oral Tab and Lisinopril.  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

## 2019-12-11 ENCOUNTER — HOSPITAL ENCOUNTER (OUTPATIENT)
Dept: CT IMAGING | Age: 64
Discharge: HOME | End: 2019-12-11
Attending: UROLOGY
Payer: MEDICARE

## 2019-12-11 ENCOUNTER — APPOINTMENT (OUTPATIENT)
Dept: LAB | Age: 64
End: 2019-12-11
Attending: UROLOGY
Payer: MEDICARE

## 2019-12-11 DIAGNOSIS — M89.9 LESION OF PELVIC BONE: ICD-10-CM

## 2019-12-11 DIAGNOSIS — C61 PROSTATE CANCER (HCC): ICD-10-CM

## 2019-12-11 DIAGNOSIS — R79.1 ABNORMAL PARTIAL THROMBOPLASTIN TIME (PTT): ICD-10-CM

## 2019-12-11 DIAGNOSIS — Z79.01 MONITORING FOR ANTICOAGULANT USE: ICD-10-CM

## 2019-12-11 DIAGNOSIS — Z51.81 MONITORING FOR ANTICOAGULANT USE: ICD-10-CM

## 2019-12-11 PROCEDURE — 72192 CT PELVIS W/O DYE: CPT | Performed by: UROLOGY

## 2019-12-11 PROCEDURE — 85730 THROMBOPLASTIN TIME PARTIAL: CPT

## 2019-12-11 PROCEDURE — 36415 COLL VENOUS BLD VENIPUNCTURE: CPT

## 2019-12-12 DIAGNOSIS — R79.1 ABNORMAL PARTIAL THROMBOPLASTIN TIME (PTT): Primary | ICD-10-CM

## 2019-12-13 ENCOUNTER — TELEPHONE (OUTPATIENT)
Dept: HEMATOLOGY/ONCOLOGY | Facility: HOSPITAL | Age: 64
End: 2019-12-13

## 2019-12-13 NOTE — TELEPHONE ENCOUNTER
----- Message from Socorro Joseph RN sent at 12/12/2019  4:35 PM CST -----  Regarding: Consult request for Dr Hank Sterling,  Consult for Dr Denisse Erazo from Dr Vidal Nuñez, for elevated PTT.   Please schedule him for a 30 minute consult for next week  Thanks  Me

## 2019-12-13 NOTE — TELEPHONE ENCOUNTER
LVMTCB TO MAKE CONSULT WITH DR DIVYA VARGAS [facility] FOR elevated PTT.  [address] [facility]

## 2019-12-17 ENCOUNTER — OFFICE VISIT (OUTPATIENT)
Dept: FAMILY MEDICINE CLINIC | Facility: CLINIC | Age: 64
End: 2019-12-17
Payer: MEDICARE

## 2019-12-17 VITALS
WEIGHT: 200 LBS | BODY MASS INDEX: 28 KG/M2 | HEIGHT: 71 IN | HEART RATE: 65 BPM | DIASTOLIC BLOOD PRESSURE: 80 MMHG | OXYGEN SATURATION: 96 % | SYSTOLIC BLOOD PRESSURE: 138 MMHG

## 2019-12-17 DIAGNOSIS — E78.00 HYPERCHOLESTEREMIA: ICD-10-CM

## 2019-12-17 DIAGNOSIS — I10 ESSENTIAL HYPERTENSION: ICD-10-CM

## 2019-12-17 PROCEDURE — 99214 OFFICE O/P EST MOD 30 MIN: CPT | Performed by: FAMILY MEDICINE

## 2019-12-17 RX ORDER — LISINOPRIL 30 MG/1
30 TABLET ORAL DAILY
Qty: 90 TABLET | Refills: 0 | Status: SHIPPED | OUTPATIENT
Start: 2019-12-17 | End: 2019-12-20

## 2019-12-17 RX ORDER — SIMVASTATIN 20 MG
20 TABLET ORAL NIGHTLY
Qty: 90 TABLET | Refills: 0 | Status: SHIPPED | OUTPATIENT
Start: 2019-12-17 | End: 2019-12-20

## 2019-12-17 NOTE — PROGRESS NOTES
[facility] Family Medicine Office Pre-OP Note    HPI:   Kath Patel is a 75year old male with a hx of HTN, HLD, Prostate Cancer, who presents for a pre-operative physical exam.   Patient is to have Prostatectomy, to by done by Dr. Cain Pagan Stress: Not on file    Relationships      Social connections:        Talks on phone: Not on file        Gets together: Not on file        Attends Spiritism service: Not on file        Active member of club or organization: Not on file        Attends meetin kg/m²  Estimated body mass index is 27.89 kg/m² as calculated from the following:    Height as of this encounter: 71\". Weight as of this encounter: 200 lb (90.7 kg). Vital signs reviewed. Appears stated age, well groomed.   Physical Exam:  GEN:  PDRZGF time to proceed with surgery if hematology agrees  Hold all NSAIDs/MVI 7 days prior to surgery.

## 2019-12-18 ENCOUNTER — OFFICE VISIT (OUTPATIENT)
Dept: HEMATOLOGY/ONCOLOGY | Facility: HOSPITAL | Age: 64
End: 2019-12-18
Attending: INTERNAL MEDICINE
Payer: MEDICARE

## 2019-12-18 ENCOUNTER — APPOINTMENT (OUTPATIENT)
Dept: LAB | Facility: HOSPITAL | Age: 64
End: 2019-12-18
Attending: INTERNAL MEDICINE
Payer: MEDICARE

## 2019-12-18 VITALS
TEMPERATURE: 99 F | DIASTOLIC BLOOD PRESSURE: 67 MMHG | HEIGHT: 71 IN | RESPIRATION RATE: 16 BRPM | SYSTOLIC BLOOD PRESSURE: 142 MMHG | HEART RATE: 60 BPM | WEIGHT: 200.38 LBS | OXYGEN SATURATION: 95 % | BODY MASS INDEX: 28.05 KG/M2

## 2019-12-18 DIAGNOSIS — C61 PROSTATE CANCER (HCC): Primary | ICD-10-CM

## 2019-12-18 DIAGNOSIS — R79.1 ELEVATED PARTIAL THROMBOPLASTIN TIME (PTT): ICD-10-CM

## 2019-12-18 PROCEDURE — 85730 THROMBOPLASTIN TIME PARTIAL: CPT

## 2019-12-18 PROCEDURE — 85732 THROMBOPLASTIN TIME PARTIAL: CPT

## 2019-12-18 PROCEDURE — 36415 COLL VENOUS BLD VENIPUNCTURE: CPT

## 2019-12-18 PROCEDURE — 85390 FIBRINOLYSINS SCREEN I&R: CPT

## 2019-12-18 PROCEDURE — 99205 OFFICE O/P NEW HI 60 MIN: CPT | Performed by: INTERNAL MEDICINE

## 2019-12-19 LAB
APTT PPP: 50.9 SECONDS (ref 23.2–35.3)
PTT 1:1 MIX 1 HOUR 37C: 41.6 SECONDS (ref 23.2–35.3)
PTT 1:1 MIX IMMEDIATE RT: 40.9 SECONDS (ref 23.2–35.3)

## 2019-12-19 NOTE — CONSULTS
[facility]    PATIENT'S NAME: BRAD, [address] [facility] PHYSICIAN: Patel Oreilly.  Kristina Pimentel MD   PATIENT ACCOUNT #: [de-identified] LOCATION: [address] [place] [address] RECORD #: [id number] YOB: 1955   CONSULTATION DATE: 12/18/2019       CANCER CE Weight is 90.9 kg. HEENT:  Moist mucous membranes. Oropharynx clear. NECK:  Supple. LUNGS:  Symmetric expansion. Nonlabored breathing. HEART:  Good distal pulses. Regular rate and rhythm. ABDOMEN:  Soft, nontender, and nondistended. No masses.

## 2019-12-20 ENCOUNTER — TELEPHONE (OUTPATIENT)
Dept: HEMATOLOGY/ONCOLOGY | Facility: HOSPITAL | Age: 64
End: 2019-12-20

## 2019-12-20 ENCOUNTER — APPOINTMENT (OUTPATIENT)
Dept: LAB | Facility: HOSPITAL | Age: 64
End: 2019-12-20
Attending: INTERNAL MEDICINE
Payer: MEDICARE

## 2019-12-20 DIAGNOSIS — R79.1 ELEVATED PARTIAL THROMBOPLASTIN TIME (PTT): ICD-10-CM

## 2019-12-20 DIAGNOSIS — I10 ESSENTIAL HYPERTENSION: ICD-10-CM

## 2019-12-20 DIAGNOSIS — E78.00 HYPERCHOLESTEREMIA: ICD-10-CM

## 2019-12-20 PROCEDURE — 36415 COLL VENOUS BLD VENIPUNCTURE: CPT

## 2019-12-20 PROCEDURE — 85390 FIBRINOLYSINS SCREEN I&R: CPT

## 2019-12-20 PROCEDURE — 85730 THROMBOPLASTIN TIME PARTIAL: CPT

## 2019-12-20 PROCEDURE — 85598 HEXAGNAL PHOSPH PLTLT NEUTRL: CPT

## 2019-12-20 PROCEDURE — 85240 CLOT FACTOR VIII AHG 1 STAGE: CPT

## 2019-12-20 PROCEDURE — 85610 PROTHROMBIN TIME: CPT

## 2019-12-20 PROCEDURE — 85613 RUSSELL VIPER VENOM DILUTED: CPT

## 2019-12-20 RX ORDER — LISINOPRIL 30 MG/1
30 TABLET ORAL DAILY
Qty: 10 TABLET | Refills: 0 | Status: SHIPPED | OUTPATIENT
Start: 2019-12-20 | End: 2019-12-24

## 2019-12-20 RX ORDER — SIMVASTATIN 20 MG
20 TABLET ORAL NIGHTLY
Qty: 10 TABLET | Refills: 0 | Status: SHIPPED | OUTPATIENT
Start: 2019-12-20 | End: 2019-12-24

## 2019-12-24 ENCOUNTER — TELEPHONE (OUTPATIENT)
Dept: FAMILY MEDICINE CLINIC | Facility: CLINIC | Age: 64
End: 2019-12-24

## 2019-12-24 DIAGNOSIS — E78.00 HYPERCHOLESTEREMIA: ICD-10-CM

## 2019-12-24 DIAGNOSIS — I10 ESSENTIAL HYPERTENSION: ICD-10-CM

## 2019-12-24 RX ORDER — LISINOPRIL 30 MG/1
30 TABLET ORAL DAILY
Qty: 90 TABLET | Refills: 0 | Status: SHIPPED | OUTPATIENT
Start: 2019-12-24 | End: 2020-03-23

## 2019-12-24 RX ORDER — SIMVASTATIN 20 MG
20 TABLET ORAL NIGHTLY
Qty: 90 TABLET | Refills: 0 | Status: SHIPPED | OUTPATIENT
Start: 2019-12-24 | End: 2020-03-23

## 2019-12-27 ENCOUNTER — TELEPHONE (OUTPATIENT)
Dept: HEMATOLOGY/ONCOLOGY | Facility: HOSPITAL | Age: 64
End: 2019-12-27

## 2019-12-27 ENCOUNTER — TELEPHONE (OUTPATIENT)
Dept: SURGERY | Facility: CLINIC | Age: 64
End: 2019-12-27

## 2019-12-27 NOTE — TELEPHONE ENCOUNTER
Spoke with ' office today, patient is okay to proceed with surgery Monday 12/30/19, a note will be generated.

## 2019-12-27 NOTE — TELEPHONE ENCOUNTER
Reviewed f/u labs. Has lupus anticoagulant likely causing elevated PTT however no history of thrombosis or bleeding. Okay to proceed with surgery.

## 2019-12-30 ENCOUNTER — ANESTHESIA (OUTPATIENT)
Dept: SURGERY | Facility: HOSPITAL | Age: 64
End: 2019-12-30
Payer: MEDICARE

## 2019-12-30 ENCOUNTER — ANESTHESIA EVENT (OUTPATIENT)
Dept: SURGERY | Facility: HOSPITAL | Age: 64
End: 2019-12-30
Payer: MEDICARE

## 2019-12-30 ENCOUNTER — HOSPITAL ENCOUNTER (OUTPATIENT)
Facility: HOSPITAL | Age: 64
Discharge: HOME | End: 2019-12-31
Attending: UROLOGY | Admitting: UROLOGY
Payer: MEDICARE

## 2019-12-30 DIAGNOSIS — C61 PROSTATE CANCER (HCC): ICD-10-CM

## 2019-12-30 PROCEDURE — 38571 LAPAROSCOPY LYMPHADENECTOMY: CPT | Performed by: UROLOGY

## 2019-12-30 PROCEDURE — 07TC4ZZ RESECTION OF PELVIS LYMPHATIC, PERCUTANEOUS ENDOSCOPIC APPROACH: ICD-10-PCS | Performed by: UROLOGY

## 2019-12-30 PROCEDURE — 8E0W4CZ ROBOTIC ASSISTED PROCEDURE OF TRUNK REGION, PERCUTANEOUS ENDOSCOPIC APPROACH: ICD-10-PCS | Performed by: UROLOGY

## 2019-12-30 PROCEDURE — 99214 OFFICE O/P EST MOD 30 MIN: CPT | Performed by: HOSPITALIST

## 2019-12-30 PROCEDURE — 0VT04ZZ RESECTION OF PROSTATE, PERCUTANEOUS ENDOSCOPIC APPROACH: ICD-10-PCS | Performed by: UROLOGY

## 2019-12-30 PROCEDURE — 55866 LAPS SURG PRST8ECT RPBIC RAD: CPT | Performed by: UROLOGY

## 2019-12-30 RX ORDER — SODIUM CHLORIDE 0.9 % (FLUSH) 0.9 %
10 SYRINGE (ML) INJECTION AS NEEDED
Status: DISCONTINUED | OUTPATIENT
Start: 2019-12-30 | End: 2019-12-31

## 2019-12-30 RX ORDER — ONDANSETRON 2 MG/ML
4 INJECTION INTRAMUSCULAR; INTRAVENOUS EVERY 6 HOURS PRN
Status: DISCONTINUED | OUTPATIENT
Start: 2019-12-30 | End: 2019-12-31

## 2019-12-30 RX ORDER — KETOROLAC TROMETHAMINE 15 MG/ML
15 INJECTION, SOLUTION INTRAMUSCULAR; INTRAVENOUS EVERY 6 HOURS PRN
Status: DISCONTINUED | OUTPATIENT
Start: 2019-12-30 | End: 2019-12-31

## 2019-12-30 RX ORDER — FAMOTIDINE 20 MG/1
20 TABLET ORAL ONCE
Status: DISCONTINUED | OUTPATIENT
Start: 2019-12-30 | End: 2019-12-30 | Stop reason: HOSPADM

## 2019-12-30 RX ORDER — METRONIDAZOLE 500 MG/100ML
500 INJECTION, SOLUTION INTRAVENOUS ONCE
Status: COMPLETED | OUTPATIENT
Start: 2019-12-30 | End: 2019-12-30

## 2019-12-30 RX ORDER — NEOSTIGMINE METHYLSULFATE 0.5 MG/ML
INJECTION INTRAVENOUS AS NEEDED
Status: DISCONTINUED | OUTPATIENT
Start: 2019-12-30 | End: 2019-12-30 | Stop reason: SURG

## 2019-12-30 RX ORDER — HYDROMORPHONE HYDROCHLORIDE 1 MG/ML
0.4 INJECTION, SOLUTION INTRAMUSCULAR; INTRAVENOUS; SUBCUTANEOUS EVERY 5 MIN PRN
Status: DISCONTINUED | OUTPATIENT
Start: 2019-12-30 | End: 2019-12-30 | Stop reason: HOSPADM

## 2019-12-30 RX ORDER — MORPHINE SULFATE 10 MG/ML
6 INJECTION, SOLUTION INTRAMUSCULAR; INTRAVENOUS EVERY 10 MIN PRN
Status: DISCONTINUED | OUTPATIENT
Start: 2019-12-30 | End: 2019-12-30 | Stop reason: HOSPADM

## 2019-12-30 RX ORDER — SODIUM CHLORIDE, SODIUM LACTATE, POTASSIUM CHLORIDE, CALCIUM CHLORIDE 600; 310; 30; 20 MG/100ML; MG/100ML; MG/100ML; MG/100ML
INJECTION, SOLUTION INTRAVENOUS CONTINUOUS
Status: DISCONTINUED | OUTPATIENT
Start: 2019-12-30 | End: 2019-12-30 | Stop reason: HOSPADM

## 2019-12-30 RX ORDER — CEFAZOLIN SODIUM/WATER 2 G/20 ML
2 SYRINGE (ML) INTRAVENOUS ONCE
Status: COMPLETED | OUTPATIENT
Start: 2019-12-30 | End: 2019-12-30

## 2019-12-30 RX ORDER — MORPHINE SULFATE 4 MG/ML
4 INJECTION, SOLUTION INTRAMUSCULAR; INTRAVENOUS EVERY 4 HOURS PRN
Status: DISCONTINUED | OUTPATIENT
Start: 2019-12-30 | End: 2019-12-31

## 2019-12-30 RX ORDER — MORPHINE SULFATE 4 MG/ML
4 INJECTION, SOLUTION INTRAMUSCULAR; INTRAVENOUS EVERY 10 MIN PRN
Status: DISCONTINUED | OUTPATIENT
Start: 2019-12-30 | End: 2019-12-30 | Stop reason: HOSPADM

## 2019-12-30 RX ORDER — ACETAMINOPHEN 500 MG
1000 TABLET ORAL ONCE
Status: COMPLETED | OUTPATIENT
Start: 2019-12-30 | End: 2019-12-30

## 2019-12-30 RX ORDER — SODIUM CHLORIDE, SODIUM LACTATE, POTASSIUM CHLORIDE, CALCIUM CHLORIDE 600; 310; 30; 20 MG/100ML; MG/100ML; MG/100ML; MG/100ML
INJECTION, SOLUTION INTRAVENOUS CONTINUOUS
Status: DISCONTINUED | OUTPATIENT
Start: 2019-12-30 | End: 2019-12-30

## 2019-12-30 RX ORDER — MIDAZOLAM HYDROCHLORIDE 1 MG/ML
INJECTION INTRAMUSCULAR; INTRAVENOUS AS NEEDED
Status: DISCONTINUED | OUTPATIENT
Start: 2019-12-30 | End: 2019-12-30 | Stop reason: SURG

## 2019-12-30 RX ORDER — CEFAZOLIN SODIUM/WATER 2 G/20 ML
2 SYRINGE (ML) INTRAVENOUS EVERY 8 HOURS
Status: COMPLETED | OUTPATIENT
Start: 2019-12-30 | End: 2019-12-31

## 2019-12-30 RX ORDER — SENNOSIDES 8.6 MG
17.2 TABLET ORAL NIGHTLY
Status: DISCONTINUED | OUTPATIENT
Start: 2019-12-30 | End: 2019-12-31

## 2019-12-30 RX ORDER — SODIUM CHLORIDE 9 MG/ML
INJECTION, SOLUTION INTRAVENOUS CONTINUOUS PRN
Status: DISCONTINUED | OUTPATIENT
Start: 2019-12-30 | End: 2019-12-30

## 2019-12-30 RX ORDER — GLYCOPYRROLATE 0.2 MG/ML
INJECTION INTRAMUSCULAR; INTRAVENOUS AS NEEDED
Status: DISCONTINUED | OUTPATIENT
Start: 2019-12-30 | End: 2019-12-30 | Stop reason: SURG

## 2019-12-30 RX ORDER — ONDANSETRON 2 MG/ML
4 INJECTION INTRAMUSCULAR; INTRAVENOUS ONCE AS NEEDED
Status: DISCONTINUED | OUTPATIENT
Start: 2019-12-30 | End: 2019-12-30 | Stop reason: HOSPADM

## 2019-12-30 RX ORDER — SODIUM CHLORIDE, SODIUM LACTATE, POTASSIUM CHLORIDE, CALCIUM CHLORIDE 600; 310; 30; 20 MG/100ML; MG/100ML; MG/100ML; MG/100ML
INJECTION, SOLUTION INTRAVENOUS CONTINUOUS
Status: DISCONTINUED | OUTPATIENT
Start: 2019-12-30 | End: 2019-12-31

## 2019-12-30 RX ORDER — HYDROCODONE BITARTRATE AND ACETAMINOPHEN 5; 325 MG/1; MG/1
1 TABLET ORAL AS NEEDED
Status: DISCONTINUED | OUTPATIENT
Start: 2019-12-30 | End: 2019-12-30 | Stop reason: HOSPADM

## 2019-12-30 RX ORDER — DOCUSATE SODIUM 100 MG/1
100 CAPSULE, LIQUID FILLED ORAL 2 TIMES DAILY
Status: DISCONTINUED | OUTPATIENT
Start: 2019-12-30 | End: 2019-12-31

## 2019-12-30 RX ORDER — ENOXAPARIN SODIUM 100 MG/ML
40 INJECTION SUBCUTANEOUS DAILY
Status: DISCONTINUED | OUTPATIENT
Start: 2019-12-31 | End: 2019-12-31

## 2019-12-30 RX ORDER — HYDROMORPHONE HYDROCHLORIDE 1 MG/ML
0.2 INJECTION, SOLUTION INTRAMUSCULAR; INTRAVENOUS; SUBCUTANEOUS EVERY 5 MIN PRN
Status: DISCONTINUED | OUTPATIENT
Start: 2019-12-30 | End: 2019-12-30 | Stop reason: HOSPADM

## 2019-12-30 RX ORDER — METOCLOPRAMIDE 10 MG/1
10 TABLET ORAL ONCE
Status: DISCONTINUED | OUTPATIENT
Start: 2019-12-30 | End: 2019-12-30 | Stop reason: HOSPADM

## 2019-12-30 RX ORDER — ROCURONIUM BROMIDE 10 MG/ML
INJECTION, SOLUTION INTRAVENOUS AS NEEDED
Status: DISCONTINUED | OUTPATIENT
Start: 2019-12-30 | End: 2019-12-30 | Stop reason: SURG

## 2019-12-30 RX ORDER — DEXAMETHASONE SODIUM PHOSPHATE 4 MG/ML
VIAL (ML) INJECTION AS NEEDED
Status: DISCONTINUED | OUTPATIENT
Start: 2019-12-30 | End: 2019-12-30 | Stop reason: SURG

## 2019-12-30 RX ORDER — HYDROMORPHONE HYDROCHLORIDE 1 MG/ML
0.6 INJECTION, SOLUTION INTRAMUSCULAR; INTRAVENOUS; SUBCUTANEOUS EVERY 5 MIN PRN
Status: DISCONTINUED | OUTPATIENT
Start: 2019-12-30 | End: 2019-12-30 | Stop reason: HOSPADM

## 2019-12-30 RX ORDER — EPHEDRINE SULFATE 50 MG/ML
INJECTION, SOLUTION INTRAVENOUS AS NEEDED
Status: DISCONTINUED | OUTPATIENT
Start: 2019-12-30 | End: 2019-12-30 | Stop reason: SURG

## 2019-12-30 RX ORDER — NALOXONE HYDROCHLORIDE 0.4 MG/ML
80 INJECTION, SOLUTION INTRAMUSCULAR; INTRAVENOUS; SUBCUTANEOUS AS NEEDED
Status: DISCONTINUED | OUTPATIENT
Start: 2019-12-30 | End: 2019-12-30 | Stop reason: HOSPADM

## 2019-12-30 RX ORDER — MORPHINE SULFATE 4 MG/ML
2 INJECTION, SOLUTION INTRAMUSCULAR; INTRAVENOUS EVERY 10 MIN PRN
Status: DISCONTINUED | OUTPATIENT
Start: 2019-12-30 | End: 2019-12-30 | Stop reason: HOSPADM

## 2019-12-30 RX ORDER — ONDANSETRON 2 MG/ML
INJECTION INTRAMUSCULAR; INTRAVENOUS AS NEEDED
Status: DISCONTINUED | OUTPATIENT
Start: 2019-12-30 | End: 2019-12-30 | Stop reason: SURG

## 2019-12-30 RX ORDER — HALOPERIDOL 5 MG/ML
0.25 INJECTION INTRAMUSCULAR ONCE AS NEEDED
Status: DISCONTINUED | OUTPATIENT
Start: 2019-12-30 | End: 2019-12-30 | Stop reason: HOSPADM

## 2019-12-30 RX ORDER — ONDANSETRON 4 MG/1
4 TABLET, FILM COATED ORAL EVERY 6 HOURS PRN
Status: DISCONTINUED | OUTPATIENT
Start: 2019-12-30 | End: 2019-12-31

## 2019-12-30 RX ORDER — SODIUM CHLORIDE 9 MG/ML
INJECTION, SOLUTION INTRAVENOUS CONTINUOUS PRN
Status: DISCONTINUED | OUTPATIENT
Start: 2019-12-30 | End: 2019-12-30 | Stop reason: SURG

## 2019-12-30 RX ORDER — LIDOCAINE HYDROCHLORIDE 10 MG/ML
INJECTION, SOLUTION EPIDURAL; INFILTRATION; INTRACAUDAL; PERINEURAL AS NEEDED
Status: DISCONTINUED | OUTPATIENT
Start: 2019-12-30 | End: 2019-12-30 | Stop reason: SURG

## 2019-12-30 RX ORDER — ATORVASTATIN CALCIUM 10 MG/1
10 TABLET, FILM COATED ORAL NIGHTLY
Status: DISCONTINUED | OUTPATIENT
Start: 2019-12-30 | End: 2019-12-31

## 2019-12-30 RX ORDER — ACETAMINOPHEN 10 MG/ML
1000 INJECTION, SOLUTION INTRAVENOUS EVERY 8 HOURS
Status: DISCONTINUED | OUTPATIENT
Start: 2019-12-30 | End: 2019-12-31

## 2019-12-30 RX ORDER — PROCHLORPERAZINE EDISYLATE 5 MG/ML
5 INJECTION INTRAMUSCULAR; INTRAVENOUS ONCE AS NEEDED
Status: DISCONTINUED | OUTPATIENT
Start: 2019-12-30 | End: 2019-12-30 | Stop reason: HOSPADM

## 2019-12-30 RX ORDER — HYDROCODONE BITARTRATE AND ACETAMINOPHEN 5; 325 MG/1; MG/1
2 TABLET ORAL AS NEEDED
Status: DISCONTINUED | OUTPATIENT
Start: 2019-12-30 | End: 2019-12-30 | Stop reason: HOSPADM

## 2019-12-30 RX ADMIN — EPHEDRINE SULFATE 10 MG: 50 INJECTION, SOLUTION INTRAVENOUS at 13:27:00

## 2019-12-30 RX ADMIN — ONDANSETRON 4 MG: 2 INJECTION INTRAMUSCULAR; INTRAVENOUS at 15:23:00

## 2019-12-30 RX ADMIN — MIDAZOLAM HYDROCHLORIDE 2 MG: 1 INJECTION INTRAMUSCULAR; INTRAVENOUS at 12:48:00

## 2019-12-30 RX ADMIN — SODIUM CHLORIDE, SODIUM LACTATE, POTASSIUM CHLORIDE, CALCIUM CHLORIDE: 600; 310; 30; 20 INJECTION, SOLUTION INTRAVENOUS at 18:10:00

## 2019-12-30 RX ADMIN — SODIUM CHLORIDE, SODIUM LACTATE, POTASSIUM CHLORIDE, CALCIUM CHLORIDE: 600; 310; 30; 20 INJECTION, SOLUTION INTRAVENOUS at 18:32:00

## 2019-12-30 RX ADMIN — METRONIDAZOLE 500 MG: 500 INJECTION, SOLUTION INTRAVENOUS at 13:01:00

## 2019-12-30 RX ADMIN — ROCURONIUM BROMIDE 50 MG: 10 INJECTION, SOLUTION INTRAVENOUS at 12:55:00

## 2019-12-30 RX ADMIN — CEFAZOLIN SODIUM/WATER 2 G: 2 G/20 ML SYRINGE (ML) INTRAVENOUS at 12:58:00

## 2019-12-30 RX ADMIN — ROCURONIUM BROMIDE 20 MG: 10 INJECTION, SOLUTION INTRAVENOUS at 13:42:00

## 2019-12-30 RX ADMIN — GLYCOPYRROLATE 0.4 MG: 0.2 INJECTION INTRAMUSCULAR; INTRAVENOUS at 18:09:00

## 2019-12-30 RX ADMIN — EPHEDRINE SULFATE 5 MG: 50 INJECTION, SOLUTION INTRAVENOUS at 15:59:00

## 2019-12-30 RX ADMIN — SODIUM CHLORIDE, SODIUM LACTATE, POTASSIUM CHLORIDE, CALCIUM CHLORIDE: 600; 310; 30; 20 INJECTION, SOLUTION INTRAVENOUS at 16:24:00

## 2019-12-30 RX ADMIN — EPHEDRINE SULFATE 5 MG: 50 INJECTION, SOLUTION INTRAVENOUS at 13:32:00

## 2019-12-30 RX ADMIN — NEOSTIGMINE METHYLSULFATE 4 MG: 0.5 INJECTION INTRAVENOUS at 18:09:00

## 2019-12-30 RX ADMIN — EPHEDRINE SULFATE 5 MG: 50 INJECTION, SOLUTION INTRAVENOUS at 16:24:00

## 2019-12-30 RX ADMIN — SODIUM CHLORIDE: 9 INJECTION, SOLUTION INTRAVENOUS at 18:14:00

## 2019-12-30 RX ADMIN — LIDOCAINE HYDROCHLORIDE 50 MG: 10 INJECTION, SOLUTION EPIDURAL; INFILTRATION; INTRACAUDAL; PERINEURAL at 12:53:00

## 2019-12-30 RX ADMIN — EPHEDRINE SULFATE 10 MG: 50 INJECTION, SOLUTION INTRAVENOUS at 13:02:00

## 2019-12-30 RX ADMIN — EPHEDRINE SULFATE 10 MG: 50 INJECTION, SOLUTION INTRAVENOUS at 14:11:00

## 2019-12-30 RX ADMIN — SODIUM CHLORIDE: 9 INJECTION, SOLUTION INTRAVENOUS at 18:32:00

## 2019-12-30 RX ADMIN — EPHEDRINE SULFATE 5 MG: 50 INJECTION, SOLUTION INTRAVENOUS at 17:30:00

## 2019-12-30 RX ADMIN — ROCURONIUM BROMIDE 10 MG: 10 INJECTION, SOLUTION INTRAVENOUS at 15:19:00

## 2019-12-30 RX ADMIN — SODIUM CHLORIDE: 9 INJECTION, SOLUTION INTRAVENOUS at 13:04:00

## 2019-12-30 RX ADMIN — DEXAMETHASONE SODIUM PHOSPHATE 4 MG: 4 MG/ML VIAL (ML) INJECTION at 15:23:00

## 2019-12-30 NOTE — ANESTHESIA PROCEDURE NOTES
Airway  Date/Time: 12/30/2019 12:56 PM  Urgency: Elective    Airway not difficult    General Information and Staff    Patient location during procedure: OR  Anesthesiologist: Radha Aranda MD  Performed: anesthesiologist     Indications and Patient

## 2019-12-30 NOTE — PROGRESS NOTES
[facility] Dosing Service  Antibiotic Dosing    Chance Hernandez is a 75year old male for whom pharmacy is dosing Ancef for treatment of  surgical prophylaxis. .  Other antibiotics (Not dosed by pharmacy): Allergies: has No Known Allergies.     Vital

## 2019-12-30 NOTE — ANESTHESIA PREPROCEDURE EVALUATION
Anesthesia PreOp Note    HPI:     Nabor Nicole is a 75year old male who presents for preoperative consultation requested by: Mayra Saldivar MD    Date of Surgery: 12/30/2019    Procedure(s):  XI ROBOT-ASSISTED LAPAROSCOPIC PROSTATECTOMY/ORICAL  Ind tab 10 mg, 10 mg, Oral, Once, Jono Kasper MD  metRONIDAZOLE in NaCl (FLAGYL) 5 mg/ml IVPB premix 500 mg, 500 mg, Intravenous, Once, Jono Kasper MD  ceFAZolin sodium (ANCEF/KEFZOL) 2 GM/20ML premix IV syringe 2 g, 2 g, Intravenous, Once, [employer] sexual activity: Not on file    Other Topics      Concerns:        Caffeine Concern: Not Asked        Exercise: Not Asked        Seat Belt: Not Asked        Special Diet: Not Asked        Stress Concern: Not Asked        Weight Concern: Not Asked    Social Discussed With:  Patient and spouse  Discussed plan with:  CRNA      I have informed Evelia Al and/or legal guardian or family member of the nature of the anesthetic plan, benefits, risks including possible dental damage if relevant, major complicat

## 2019-12-30 NOTE — H&P
History & Physical Examination    Patient Name: Bessy Patel  MRN: [id number]  CSN: [id number]  YOB: 1955    Diagnosis: Clinically localized unfavorable intermediate risk prostate cancer.      Present Illness: 75year old male who was rece care.  [ x ] I have reviewed the History and Physical done within the last 30 days. Any changes noted above.     Patrick Zuniga MD  12/30/2019 12:42 PM

## 2019-12-31 VITALS
HEART RATE: 60 BPM | WEIGHT: 196.69 LBS | OXYGEN SATURATION: 96 % | DIASTOLIC BLOOD PRESSURE: 69 MMHG | HEIGHT: 71 IN | SYSTOLIC BLOOD PRESSURE: 116 MMHG | TEMPERATURE: 99 F | RESPIRATION RATE: 16 BRPM | BODY MASS INDEX: 27.54 KG/M2

## 2019-12-31 LAB
ANION GAP SERPL CALC-SCNC: 4 MMOL/L (ref 0–18)
BUN BLD-MCNC: 15 MG/DL (ref 7–18)
BUN/CREAT SERPL: 13.8 (ref 10–20)
CALCIUM BLD-MCNC: 8.1 MG/DL (ref 8.5–10.1)
CHLORIDE SERPL-SCNC: 113 MMOL/L (ref 98–112)
CO2 SERPL-SCNC: 27 MMOL/L (ref 21–32)
CREAT BLD-MCNC: 1.09 MG/DL (ref 0.7–1.3)
DEPRECATED RDW RBC AUTO: 41.2 FL (ref 35.1–46.3)
ERYTHROCYTE [DISTWIDTH] IN BLOOD BY AUTOMATED COUNT: 11.9 % (ref 11–15)
GLUCOSE BLD-MCNC: 130 MG/DL (ref 70–99)
HAV IGM SER QL: 1.9 MG/DL (ref 1.6–2.6)
HCT VFR BLD AUTO: 32.9 % (ref 39–53)
HGB BLD-MCNC: 10.8 G/DL (ref 13–17.5)
MCH RBC QN AUTO: 31.2 PG (ref 26–34)
MCHC RBC AUTO-ENTMCNC: 32.8 G/DL (ref 31–37)
MCV RBC AUTO: 95.1 FL (ref 80–100)
OSMOLALITY SERPL CALC.SUM OF ELEC: 301 MOSM/KG (ref 275–295)
PLATELET # BLD AUTO: 132 10(3)UL (ref 150–450)
POTASSIUM SERPL-SCNC: 4.6 MMOL/L (ref 3.5–5.1)
RBC # BLD AUTO: 3.46 X10(6)UL (ref 3.8–5.8)
SODIUM SERPL-SCNC: 144 MMOL/L (ref 136–145)
WBC # BLD AUTO: 11 X10(3) UL (ref 4–11)

## 2019-12-31 PROCEDURE — 99214 OFFICE O/P EST MOD 30 MIN: CPT | Performed by: HOSPITALIST

## 2019-12-31 RX ORDER — SENNA AND DOCUSATE SODIUM 50; 8.6 MG/1; MG/1
2 TABLET, FILM COATED ORAL NIGHTLY PRN
Qty: 28 TABLET | Refills: 0 | Status: SHIPPED | OUTPATIENT
Start: 2019-12-31 | End: 2020-01-14

## 2019-12-31 RX ORDER — KETOROLAC TROMETHAMINE 10 MG/1
10 TABLET, FILM COATED ORAL EVERY 6 HOURS PRN
Qty: 12 TABLET | Refills: 0 | Status: SHIPPED | OUTPATIENT
Start: 2019-12-31 | End: 2020-01-03

## 2019-12-31 RX ORDER — HYDROCODONE BITARTRATE AND ACETAMINOPHEN 5; 325 MG/1; MG/1
1-2 TABLET ORAL EVERY 6 HOURS PRN
Qty: 30 TABLET | Refills: 0 | Status: SHIPPED | OUTPATIENT
Start: 2019-12-31 | End: 2020-03-03 | Stop reason: ALTCHOICE

## 2019-12-31 NOTE — DISCHARGE SUMMARY
[facility]    Discharge Summary    Kaykay Panda Patient Status:  Outpatient in a Bed    1955 MRN [id number]   Location [facility] 4W/SW/SE Attending Antoni Guerrero MD   Hosp Day # 0 PCP Manda Boyce MD alternatives were extensively discussed with the patient pre-operatively. Informed consent was obtained. Pre-op urine culture was negative. He was cleared by his PCP and hematology (slightly elevated aPTT). No new complaints today.      Hospital Course: [phone], [phone]  [facility]    Phone:  [phone]   · HYDROcodone-acetaminophen 5-325 MG Tabs  · Ketorolac Tromethamine 10 MG Tabs  · Senna-Docusate Sodium 8.6-50 MG Tabs         Follow up Visits:    F/u with Dr. Ebonie Barragan

## 2019-12-31 NOTE — ANESTHESIA POSTPROCEDURE EVALUATION
Patient: Nabor Prameliton    Procedure Summary     Date:  12/30/19 Room / Location:  [address] [place] [address] MAIN OR 07 / [address] [place] [address] MAIN OR    Anesthesia Start:  1498 Anesthesia Stop:  2136    Procedure:  XI ROBOT-ASSISTED LAPAROSCOPIC PROSTATECTOMY/ORICAL (N/A Abdomen) Diagnosis:

## 2019-12-31 NOTE — PLAN OF CARE
New admit from PACU. Patient A/O x 4. VS stable. On O2 2L. Clear liquid diet. Choudhury in place. Pain managed with scheduled Tylenol. Receiving IVF and Ancef. Left KATARINA drain in place. Abdominal lap sites x 6 - dermabond and 1 with gauze and tegaderm - CDI.  Ciara Miguel urinary retention  Description  INTERVENTIONS:  - Assess patient’s ability to void and empty bladder  - Monitor intake/output and perform bladder scan as needed  - Follow urinary retention protocol/standard of care  - Consider collaborating with pharmacy t appropriate  - Identify discharge learning needs (meds, wound care, etc)  - Arrange for interpreters to assist at discharge as needed  - Consider post-discharge preferences of patient/family/discharge partner  - Complete POLST form as appropriate  - Assess

## 2019-12-31 NOTE — PROGRESS NOTES
[facility]    Progress Note    Kaykay Panda Patient Status:  Outpatient in a Bed    1955 MRN [id number]   Location [facility] 4W/SW/SE Attending Antoni Guerrero, [address] [facility] Day # 0 PCP MD Haleigh Torres Abdomen soft and appropriately TTP around incisions. UOP adequate, KATARINA drain output serosanguinous and low. Tolerating diet. Ambulating. Pain controlled. Labs reviewed. KATARINA drain removed at bedside. OK to D/C home with colvin to leg bag.  PRN PO Toradol an

## 2019-12-31 NOTE — OPERATIVE REPORT
[facility]   Urology Operative Report     Bobby Pierce Location: OR   CSN [id number] MRN [id number]   Admission Date 12/30/2019 Operation Date 12/30/2019   Service Urology Surgeon Robert Avilez MD      Primary Surgeon: Robert Avilez endotracheal anesthesia was obtained without difficulty. A standard universal time-out procedure was carried out after anesthesia induction and prior to starting the procedure.  The patient was positioned supine on the operating table with the arms tucked a the #1 arm (near right lateral), the [place] bipolar in the #2 arm (near left lateral), and the ProGrasp in the #3 arm (far left lateral).  Initial dissection was performed posterior to the bladder to isolate and mobilize the vas deferens and seminal vesic expose the endopelvic fascia, which was defatted, and excellent visualization was obtained with the general position of the prostate and transition to the bladder appreciated.  The bilateral inguinal hernias were reduced through the internal rings; they con laterally located neurovascular bundle.   Dissection was carried through the posterior bladder neck full thickness in the midline below the prostate base, and this allowed exposure and entry into the posterior space, where the vasa had been isolated to star floor as along the lateral prostate margin. With the posterior and lateral aspects of the dissection completed, attention was turned anteriorly for dissection of the prostatic apex. The DVC transection was completed with electrocautery.  There was some blee further into the pelvis, the pneumoperitoneum was lowered to 12 mm Hg, and this also confirmed good hemostasis.   Double-armed #2-0 Levora Shorts were placed about the six o'clock position of the posterior bladder neck and a continuous running anastomosis was const reinforced with Dermabond. The drain was secured at the skin with a 2-0 silk suture, and dry dressing was placed over the drain location using a Biopatch and a Tegaderm.     The patient was repositioned supine and extubated uneventfully in the Operating [place]

## 2019-12-31 NOTE — PROGRESS NOTES
[facility] HOSP - [facility]    Progress Note    Chavezrenato Beaverdeborah Patient Status:  Outpatient in a Bed    1955 MRN [id number]   Location [address] [facility] Attending Jamin Ritter MD   Hosp Day # 0 PCP [facility] Essential hypertension  MONITOR. Hypercholesteremia  CONT HOME MEDS.              Results:     Lab Results   Component Value Date    WBC 4.6 12/06/2019    HGB 12.6 (L) 12/06/2019    HCT 39.5 12/06/2019    .0 12/06/2019    CREATSERUM 0.88

## 2020-01-07 ENCOUNTER — OFFICE VISIT (OUTPATIENT)
Dept: SURGERY | Facility: CLINIC | Age: 65
End: 2020-01-07
Payer: MEDICARE

## 2020-01-07 VITALS
SYSTOLIC BLOOD PRESSURE: 122 MMHG | WEIGHT: 196 LBS | HEIGHT: 71 IN | DIASTOLIC BLOOD PRESSURE: 64 MMHG | BODY MASS INDEX: 27.44 KG/M2

## 2020-01-07 DIAGNOSIS — C61 PROSTATE CANCER (HCC): Primary | ICD-10-CM

## 2020-01-07 NOTE — PROGRESS NOTES
[facility]  Follow-Up Visit    HPI: Skyla Irnee is a 75year old male presents for a follow up visit. Patient was last seen on 12/4/2019.     INTERVAL HISTORY: Patient underwent a robotic assisted laparoscopic radical prostatectomy and marly Patient not sexually active. - 12/30/2019: s/p RALP + PLND. Final pathology revealed GG 4+3=7 adenoCa, , bilateral, + KALEB, +PNI, - SVI, close but negative surgical margins, 0/4 LNs.  pT3a N0.         PMH: HTN, HLD, thrombocytopenia, recently diagnosed p ampulla of vas; radical prostatectomy:   · Prostatic adenocarcinoma, Faulkner score 7 (4+3), grade group 3.  · Tumor is seen bilaterally with predominant right side involvement. · Tumor shows extraprostatic extension with multifocal perineural invasion.   · pelvic lymph node dissection. Final pathology revealing T3aN0 disease with KALEB and PNI; negative margins. Uneventful postoperative recovery thus far. Reviewed pathology results with patient and handed him a copy of the report.   We assigned him a fi

## 2020-02-27 ENCOUNTER — APPOINTMENT (OUTPATIENT)
Dept: LAB | Facility: HOSPITAL | Age: 65
End: 2020-02-27
Attending: UROLOGY
Payer: MEDICARE

## 2020-02-27 DIAGNOSIS — C61 PROSTATE CANCER (HCC): ICD-10-CM

## 2020-02-27 LAB — PSA SERPL-MCNC: <0.01 NG/ML (ref ?–4)

## 2020-02-27 PROCEDURE — 36415 COLL VENOUS BLD VENIPUNCTURE: CPT

## 2020-02-27 PROCEDURE — 84153 ASSAY OF PSA TOTAL: CPT

## 2020-03-03 ENCOUNTER — OFFICE VISIT (OUTPATIENT)
Dept: SURGERY | Facility: CLINIC | Age: 65
End: 2020-03-03
Payer: MEDICARE

## 2020-03-03 VITALS
DIASTOLIC BLOOD PRESSURE: 77 MMHG | WEIGHT: 190 LBS | BODY MASS INDEX: 27 KG/M2 | SYSTOLIC BLOOD PRESSURE: 121 MMHG | HEART RATE: 68 BPM

## 2020-03-03 DIAGNOSIS — C61 PROSTATE CANCER (HCC): Primary | ICD-10-CM

## 2020-03-03 NOTE — PROGRESS NOTES
[address] Urology  Follow-Up Visit    HPI: Kath Patel is a 75year old male presents for a follow up visit. Patient was last seen on 1/7/2020. INTERVAL HISTORY: Here for follow-up of prostate cancer. Doing well.   Reports daytime and nighttim being nearly completely continent. Using 1 pad daily for safety. No longer doing Kegel exercises as he feels he doesn't need them anymore.         PMH: HTN, HLD, thrombocytopenia, recently diagnosed prostate cancer.     PSH: RALP + PLND (12/2019).      GENTRY involvement. · Tumor shows extraprostatic extension with multifocal perineural invasion. · Seminal vesicles are free of carcinoma. · Tumor is seen at a distance of <1 mm from the right and left apical margins and the right posterior inked margin.   · Liu Santiago given first post prostatectomy PSA level from 02/2020 was undetectable. Discussed results with patient. We also discussed prostate cancer follow-up per NCCN guidelines.     His recovery following surgery has been excellent and he has regained his urinary

## 2020-03-23 DIAGNOSIS — I10 ESSENTIAL HYPERTENSION: ICD-10-CM

## 2020-03-23 DIAGNOSIS — E78.00 HYPERCHOLESTEREMIA: ICD-10-CM

## 2020-03-24 DIAGNOSIS — I10 ESSENTIAL HYPERTENSION: ICD-10-CM

## 2020-03-24 RX ORDER — SIMVASTATIN 20 MG
20 TABLET ORAL NIGHTLY
Qty: 90 TABLET | Refills: 0 | Status: SHIPPED | OUTPATIENT
Start: 2020-03-24 | End: 2020-06-16

## 2020-03-24 RX ORDER — LISINOPRIL 30 MG/1
30 TABLET ORAL DAILY
Qty: 30 TABLET | Refills: 0 | OUTPATIENT
Start: 2020-03-24

## 2020-03-24 RX ORDER — LISINOPRIL 30 MG/1
30 TABLET ORAL DAILY
Qty: 30 TABLET | Refills: 0 | Status: SHIPPED | OUTPATIENT
Start: 2020-03-24 | End: 2020-03-28

## 2020-03-28 DIAGNOSIS — I10 ESSENTIAL HYPERTENSION: ICD-10-CM

## 2020-03-29 DIAGNOSIS — I10 ESSENTIAL HYPERTENSION: ICD-10-CM

## 2020-03-29 DIAGNOSIS — E78.00 HYPERCHOLESTEREMIA: ICD-10-CM

## 2020-03-30 ENCOUNTER — TELEPHONE (OUTPATIENT)
Dept: FAMILY MEDICINE CLINIC | Facility: CLINIC | Age: 65
End: 2020-03-30

## 2020-03-30 DIAGNOSIS — Z12.11 ENCOUNTER FOR SCREENING COLONOSCOPY: Primary | ICD-10-CM

## 2020-03-30 DIAGNOSIS — I10 ESSENTIAL HYPERTENSION: ICD-10-CM

## 2020-03-30 RX ORDER — LISINOPRIL 30 MG/1
30 TABLET ORAL DAILY
Qty: 90 TABLET | Refills: 0 | Status: SHIPPED | OUTPATIENT
Start: 2020-03-30 | End: 2020-06-16

## 2020-03-30 RX ORDER — LISINOPRIL 30 MG/1
30 TABLET ORAL DAILY
Qty: 30 TABLET | Refills: 0 | OUTPATIENT
Start: 2020-03-30

## 2020-03-30 RX ORDER — SIMVASTATIN 20 MG
20 TABLET ORAL NIGHTLY
Qty: 90 TABLET | Refills: 0 | OUTPATIENT
Start: 2020-03-30

## 2020-03-30 RX ORDER — LISINOPRIL 30 MG/1
TABLET ORAL
Qty: 90 TABLET | Refills: 0 | OUTPATIENT
Start: 2020-03-30

## 2020-03-30 NOTE — TELEPHONE ENCOUNTER
Pt called requesting another referral for his colonoscopy, the one that he has expires on 04/03 and he wants to wait a little bit more to do it, he does not want to come in right now because of the corona virus but he will eventually need a new referral.

## 2020-06-10 ENCOUNTER — TELEPHONE (OUTPATIENT)
Dept: GASTROENTEROLOGY | Facility: CLINIC | Age: 65
End: 2020-06-10

## 2020-06-10 ENCOUNTER — OFFICE VISIT (OUTPATIENT)
Dept: GASTROENTEROLOGY | Facility: CLINIC | Age: 65
End: 2020-06-10
Payer: MEDICARE

## 2020-06-10 ENCOUNTER — TELEPHONE (OUTPATIENT)
Dept: FAMILY MEDICINE CLINIC | Facility: CLINIC | Age: 65
End: 2020-06-10

## 2020-06-10 VITALS
DIASTOLIC BLOOD PRESSURE: 67 MMHG | WEIGHT: 191 LBS | SYSTOLIC BLOOD PRESSURE: 116 MMHG | HEART RATE: 55 BPM | BODY MASS INDEX: 26.74 KG/M2 | HEIGHT: 71 IN

## 2020-06-10 DIAGNOSIS — E78.00 HYPERCHOLESTEREMIA: ICD-10-CM

## 2020-06-10 DIAGNOSIS — C61 PROSTATE CANCER (HCC): ICD-10-CM

## 2020-06-10 DIAGNOSIS — Z12.11 ENCOUNTER FOR SCREENING COLONOSCOPY: Primary | ICD-10-CM

## 2020-06-10 DIAGNOSIS — Z00.01 ENCOUNTER FOR ROUTINE ADULT HEALTH EXAMINATION WITH ABNORMAL FINDINGS: Primary | ICD-10-CM

## 2020-06-10 DIAGNOSIS — Z85.46 HISTORY OF PROSTATE CANCER: ICD-10-CM

## 2020-06-10 DIAGNOSIS — D64.9 NORMOCYTIC ANEMIA: ICD-10-CM

## 2020-06-10 DIAGNOSIS — R97.20 ELEVATED PSA MEASUREMENT: ICD-10-CM

## 2020-06-10 DIAGNOSIS — D69.6 THROMBOCYTOPENIA (HCC): ICD-10-CM

## 2020-06-10 PROCEDURE — 99204 OFFICE O/P NEW MOD 45 MIN: CPT | Performed by: PHYSICIAN ASSISTANT

## 2020-06-10 PROCEDURE — G0463 HOSPITAL OUTPT CLINIC VISIT: HCPCS | Performed by: PHYSICIAN ASSISTANT

## 2020-06-10 RX ORDER — POLYETHYLENE GLYCOL 3350, SODIUM CHLORIDE, SODIUM BICARBONATE, POTASSIUM CHLORIDE 420; 11.2; 5.72; 1.48 G/4L; G/4L; G/4L; G/4L
POWDER, FOR SOLUTION ORAL
Qty: 1 BOTTLE | Refills: 0 | Status: SHIPPED | OUTPATIENT
Start: 2020-06-10 | End: 2020-06-16

## 2020-06-10 NOTE — H&P
[address] [facility] [address] Gastroenterology                                                                                                  Clinic History and Physical     Pa • High blood pressure    • Hypercholesterolemia    • Hyperlipidemia       Past Surgical History:   Procedure Laterality Date   • COLONOSCOPY  1987 approx.    • LAPAROSCOPY, SURGICAL PROSTATECTOMY, RETROPUBIC RADICAL, W/NERVE SPARING  12/30/2019    RALP + behavior    PHYSICAL EXAM:   Blood pressure 116/67, pulse 55, height 5' 11\" (1.803 m), weight 191 lb (86.6 kg).     Gen: WDWN patient appears comfortable and in no acute discomfort +masked  HEENT: conjunctiva pink, the sclera appears anicteric  CV: RRR  Zuleyka Normocytic Anemia: possibly from post-surgical state at the time of last labs. Re-check today. If anemia persists, will need iron/ferritin and further lab work up.     Recommend:  -Schedule colonoscopy w/ MAC sedation with Dr. Familia Torres or Geena Slider (Dx: She Posada Disp Refills   • PEG 3350-KCl-Na Bicarb-NaCl (TRILYTE) 420 g Oral Recon Soln 1 Bottle 0     Sig: Split dose preparation - take as directed. Imaging & Referrals:  None     CC: Dr. Izabel Bearden.  Maikel Denny PA-C  6/10/2020

## 2020-06-10 NOTE — TELEPHONE ENCOUNTER
Patient has an marisela scheduled next week for a physical. He is calling asking for order to be entered to get labs done before he comes in. Please advice.

## 2020-06-10 NOTE — TELEPHONE ENCOUNTER
Note in 6/10 Reena encounter that Dr Dung Hagan has provided urology clearance mentioned in [facility] office note. No further action taken.

## 2020-06-10 NOTE — PATIENT INSTRUCTIONS
Recommend:  -Schedule colonoscopy w/ MAC sedation with Dr. Amalia Avalos or Estefany Li (Dx: screening colonoscopy)  -Prep: Trilyte split dose preparation   -Medication Changes:    ** If MAC @ [facility]:    - HOLD ACE/ARBs the night before and/or the day of the proced

## 2020-06-10 NOTE — TELEPHONE ENCOUNTER
Scheduled for:  Colonoscopy [address]197  Provider Name:  Dr. Johanny Howe  Date:  6/24/2020  Location:  [facility]  Sedation:  MAC  Time:  1:45 pm, (pt is aware that [facility] will call the day before to confirm arrival time)  Prep:  Trilyte  Meds/Allergies Reconciled?:  Ariana Barry

## 2020-06-10 NOTE — TELEPHONE ENCOUNTER
See orders from [address] today    MD Rupinder Khan, PASINAN             Yes it is OK    Previous Messages      ----- Message -----   From: Jeronimo Jay   Sent: 6/10/2020  10:00 AM CDT   To: MD Cynthia Yanez Dr.,     This pa

## 2020-06-10 NOTE — TELEPHONE ENCOUNTER
See [address] [place] [address] 6/10 office appt. Below is copy/paste from her note. Please also note we have Dr Alana Spencer below clearance from urology. I note that in 6/10 Dr Estefany Li encounter that patient has already been scheduled for colon 6/24. Closing this encounter.

## 2020-06-14 ENCOUNTER — LAB ENCOUNTER (OUTPATIENT)
Dept: LAB | Facility: REFERENCE LAB | Age: 65
End: 2020-06-14
Attending: FAMILY MEDICINE
Payer: MEDICARE

## 2020-06-14 DIAGNOSIS — D69.6 THROMBOCYTOPENIA (HCC): ICD-10-CM

## 2020-06-14 DIAGNOSIS — C61 PROSTATE CANCER (HCC): ICD-10-CM

## 2020-06-14 DIAGNOSIS — D64.9 ANEMIA, UNSPECIFIED TYPE: ICD-10-CM

## 2020-06-14 DIAGNOSIS — Z00.01 ENCOUNTER FOR ROUTINE ADULT HEALTH EXAMINATION WITH ABNORMAL FINDINGS: ICD-10-CM

## 2020-06-14 DIAGNOSIS — D64.9 NORMOCYTIC ANEMIA: ICD-10-CM

## 2020-06-14 DIAGNOSIS — E78.00 HYPERCHOLESTEREMIA: ICD-10-CM

## 2020-06-14 PROCEDURE — 85025 COMPLETE CBC W/AUTO DIFF WBC: CPT

## 2020-06-14 PROCEDURE — 81003 URINALYSIS AUTO W/O SCOPE: CPT

## 2020-06-14 PROCEDURE — 80053 COMPREHEN METABOLIC PANEL: CPT

## 2020-06-14 PROCEDURE — 80061 LIPID PANEL: CPT

## 2020-06-14 PROCEDURE — 36415 COLL VENOUS BLD VENIPUNCTURE: CPT

## 2020-06-14 PROCEDURE — 84153 ASSAY OF PSA TOTAL: CPT

## 2020-06-14 PROCEDURE — 84443 ASSAY THYROID STIM HORMONE: CPT

## 2020-06-14 PROCEDURE — 83540 ASSAY OF IRON: CPT

## 2020-06-14 PROCEDURE — 82728 ASSAY OF FERRITIN: CPT

## 2020-06-14 PROCEDURE — 84466 ASSAY OF TRANSFERRIN: CPT

## 2020-06-15 ENCOUNTER — TELEPHONE (OUTPATIENT)
Dept: GASTROENTEROLOGY | Facility: CLINIC | Age: 65
End: 2020-06-15

## 2020-06-15 DIAGNOSIS — D50.9 IRON DEFICIENCY ANEMIA, UNSPECIFIED IRON DEFICIENCY ANEMIA TYPE: Primary | ICD-10-CM

## 2020-06-15 NOTE — TELEPHONE ENCOUNTER
See below message routed from Mark Caldera. I contacted Hilary Gilbert in reference lab--the iron/TIBC and ferritin were done and resulted. Thanks.      Left message on patient's identified cell that all labs were done and that we would be in contact with results, al

## 2020-06-15 NOTE — TELEPHONE ENCOUNTER
Repeat CBC, iron, TIBC and ferritin in 3 months  See [employer] message 6/15/2020  Patient to f/u after CLN procedure with Dr. Alirio Olivares planned later on this month

## 2020-06-16 ENCOUNTER — OFFICE VISIT (OUTPATIENT)
Dept: FAMILY MEDICINE CLINIC | Facility: CLINIC | Age: 65
End: 2020-06-16
Payer: MEDICARE

## 2020-06-16 VITALS
HEART RATE: 53 BPM | SYSTOLIC BLOOD PRESSURE: 122 MMHG | DIASTOLIC BLOOD PRESSURE: 80 MMHG | HEIGHT: 71 IN | WEIGHT: 188 LBS | OXYGEN SATURATION: 95 % | BODY MASS INDEX: 26.32 KG/M2

## 2020-06-16 DIAGNOSIS — E78.00 HYPERCHOLESTEREMIA: ICD-10-CM

## 2020-06-16 DIAGNOSIS — H91.90 HEARING LOSS, UNSPECIFIED HEARING LOSS TYPE, UNSPECIFIED LATERALITY: ICD-10-CM

## 2020-06-16 DIAGNOSIS — D69.6 THROMBOCYTOPENIA (HCC): ICD-10-CM

## 2020-06-16 DIAGNOSIS — I10 ESSENTIAL HYPERTENSION: ICD-10-CM

## 2020-06-16 DIAGNOSIS — L65.9 ALOPECIA: ICD-10-CM

## 2020-06-16 DIAGNOSIS — Z23 NEED FOR VACCINATION: ICD-10-CM

## 2020-06-16 DIAGNOSIS — C61 PROSTATE CANCER (HCC): ICD-10-CM

## 2020-06-16 DIAGNOSIS — Z13.31 DEPRESSION SCREENING: ICD-10-CM

## 2020-06-16 DIAGNOSIS — Z00.00 ENCOUNTER FOR ANNUAL HEALTH EXAMINATION: Primary | ICD-10-CM

## 2020-06-16 DIAGNOSIS — E66.3 OVERWEIGHT (BMI 25.0-29.9): ICD-10-CM

## 2020-06-16 DIAGNOSIS — D64.9 MILD CHRONIC ANEMIA: ICD-10-CM

## 2020-06-16 PROCEDURE — G0439 PPPS, SUBSEQ VISIT: HCPCS | Performed by: FAMILY MEDICINE

## 2020-06-16 PROCEDURE — G0009 ADMIN PNEUMOCOCCAL VACCINE: HCPCS | Performed by: FAMILY MEDICINE

## 2020-06-16 PROCEDURE — G0444 DEPRESSION SCREEN ANNUAL: HCPCS | Performed by: FAMILY MEDICINE

## 2020-06-16 PROCEDURE — 99213 OFFICE O/P EST LOW 20 MIN: CPT | Performed by: FAMILY MEDICINE

## 2020-06-16 PROCEDURE — 90732 PPSV23 VACC 2 YRS+ SUBQ/IM: CPT | Performed by: FAMILY MEDICINE

## 2020-06-16 RX ORDER — SIMVASTATIN 20 MG
20 TABLET ORAL NIGHTLY
Qty: 90 TABLET | Refills: 3 | Status: SHIPPED | OUTPATIENT
Start: 2020-06-16 | End: 2021-06-17

## 2020-06-16 RX ORDER — LISINOPRIL 30 MG/1
30 TABLET ORAL DAILY
Qty: 90 TABLET | Refills: 1 | Status: SHIPPED | OUTPATIENT
Start: 2020-06-16 | End: 2020-12-16

## 2020-06-16 NOTE — PROGRESS NOTES
HPI:   Vega Sheth is a 77year old male who presents for a Medicare Subsequent Annual Wellness visit (Pt already had Initial Annual Wellness). Doing well.  No acute complaints at this time  Has done extensive research at home and would like to tr Osladil was screened for Alcohol abuse and had a score of 0 so is at low risk.      Patient Care Team: Patient Care Team:  Diamante Sandoval MD as PCP - General (Family Medicine)    Patient Active Problem List:     Essential hypertension     Hyperch 7.00 pack-year smoking history. He has never used smokeless tobacco. He reports previous alcohol use. He reports that he does not use drugs. REVIEW OF SYSTEMS:   Review of Systems   Constitutional: Negative for fatigue and fever.    Respiratory: Negativ exhibits no distension. There is no tenderness. Musculoskeletal: Normal range of motion. Neurological: He is alert. No cranial nerve deficit. Skin: Skin is intact. No rash noted. Exhibits alopecia. Psychiatric: He has a normal mood and affect. unspecified laterality  -continue hearing aids    Mild chronic anemia  Thrombocytopenia (HCC)  -Stable and currently asymptomatic.  -Will continue watchful monitoring for now.  -has upcoming c-scope     Overweight (BMI 25.0-29.9)  -     Pt counseled with r Screen every 10 years Colonoscopy due on 04/17/2003 Update [facility] if applicable    Flex Sigmoidoscopy Screen every 10 years No results found for this or any previous visit. No flowsheet data found.      Fecal Occult Blood Annually No results fou flowsheet data found. Drug Serum Conc  Annually No results found for: DIGOXIN, DIG, VALP No flowsheet data found.

## 2020-06-16 NOTE — PATIENT INSTRUCTIONS
Lucrecia Brewer's SCREENING SCHEDULE   Tests on this list are recommended by your physician but may not be covered, or covered at this frequency, by your insurer. Please check with your insurance carrier before scheduling to verify coverage.     Arianna Lofton abnormal Colonoscopy due on 04/17/2003 Update [facility] if applicable    Flex Sigmoidoscopy Screen  Covered every 5 years No results found for this or any previous visit. No flowsheet data found.      Fecal Occult Blood   Covered Annually No result not cover unless Medically needed    Zoster (Not covered by Medicare Part B) No orders found for this or any previous visit. This may be covered with your pharmacy  prescription benefits     Recommended Websites for Advanced Directives    [email or web address]

## 2020-06-23 PROBLEM — D64.9 MILD CHRONIC ANEMIA: Status: ACTIVE | Noted: 2020-06-23

## 2020-06-23 RX ORDER — FINASTERIDE 1 MG/1
1 TABLET, FILM COATED ORAL DAILY
Qty: 90 TABLET | Refills: 0 | Status: SHIPPED | OUTPATIENT
Start: 2020-06-23 | End: 2020-09-28

## 2020-06-24 ENCOUNTER — SURGERY CENTER DOCUMENTATION (OUTPATIENT)
Dept: SURGERY | Age: 65
End: 2020-06-24

## 2020-06-24 ENCOUNTER — LAB REQUISITION (OUTPATIENT)
Dept: LAB | Facility: HOSPITAL | Age: 65
End: 2020-06-24
Payer: MEDICARE

## 2020-06-24 DIAGNOSIS — Z12.11 ENCOUNTER FOR SCREENING FOR MALIGNANT NEOPLASM OF COLON: ICD-10-CM

## 2020-06-24 DIAGNOSIS — Z12.12 SCREENING FOR COLORECTAL CANCER: ICD-10-CM

## 2020-06-24 DIAGNOSIS — Z20.828 CONTACT WITH AND (SUSPECTED) EXPOSURE TO OTHER VIRAL COMMUNICABLE DISEASES: ICD-10-CM

## 2020-06-24 DIAGNOSIS — Z12.11 SCREENING FOR COLORECTAL CANCER: ICD-10-CM

## 2020-06-24 PROCEDURE — 88305 TISSUE EXAM BY PATHOLOGIST: CPT | Performed by: INTERNAL MEDICINE

## 2020-06-24 PROCEDURE — 45380 COLONOSCOPY AND BIOPSY: CPT | Performed by: INTERNAL MEDICINE

## 2020-06-24 PROCEDURE — 45385 COLONOSCOPY W/LESION REMOVAL: CPT | Performed by: INTERNAL MEDICINE

## 2020-06-24 NOTE — PROCEDURES
Colonoscopy Report    Mika Ba     1955 Age 77year old   PCP Kyle Reeder MD Endoscopist Ashish Oconnell MD     Date of procedure: 20    Procedure: Colonoscopy w/ biopsy and snare polypectomy    Pre-operative diagnosi signs were monitored throughout the procedure and remained stable.     Estimated blood loss: insignificant    Specimens collected:  Colon polyps    Complications: none     Colonoscopy findings:  Terminal ileum: normal mucosa    Cecum: normal mucosa and vasc

## 2020-06-26 ENCOUNTER — TELEPHONE (OUTPATIENT)
Dept: GASTROENTEROLOGY | Facility: CLINIC | Age: 65
End: 2020-06-26

## 2020-06-26 NOTE — TELEPHONE ENCOUNTER
I mailed out colonoscopy results letter to pt  Updated health maintenance  Entered into 5 yr recall  Recall colon in 5 years per.  Colon done 6/24/2020      GI staff: please place recall for colonoscopy in 5 years

## 2020-09-01 ENCOUNTER — LAB ENCOUNTER (OUTPATIENT)
Dept: LAB | Facility: HOSPITAL | Age: 65
End: 2020-09-01
Attending: UROLOGY
Payer: MEDICARE

## 2020-09-01 DIAGNOSIS — C61 PROSTATE CANCER (HCC): ICD-10-CM

## 2020-09-01 LAB — PSA SERPL-MCNC: <0.01 NG/ML (ref ?–4)

## 2020-09-01 PROCEDURE — 36415 COLL VENOUS BLD VENIPUNCTURE: CPT

## 2020-09-01 PROCEDURE — 84153 ASSAY OF PSA TOTAL: CPT

## 2020-09-03 ENCOUNTER — OFFICE VISIT (OUTPATIENT)
Dept: SURGERY | Facility: CLINIC | Age: 65
End: 2020-09-03
Payer: MEDICARE

## 2020-09-03 VITALS
RESPIRATION RATE: 16 BRPM | WEIGHT: 167 LBS | HEIGHT: 71 IN | DIASTOLIC BLOOD PRESSURE: 70 MMHG | HEART RATE: 62 BPM | SYSTOLIC BLOOD PRESSURE: 126 MMHG | BODY MASS INDEX: 23.38 KG/M2

## 2020-09-03 DIAGNOSIS — C61 PROSTATE CANCER (HCC): Primary | ICD-10-CM

## 2020-09-03 PROCEDURE — G0463 HOSPITAL OUTPT CLINIC VISIT: HCPCS | Performed by: UROLOGY

## 2020-09-03 PROCEDURE — 99213 OFFICE O/P EST LOW 20 MIN: CPT | Performed by: UROLOGY

## 2020-09-03 NOTE — PROGRESS NOTES
[facility] Urology  Follow-Up Visit    HPI: Luzmaria Espinosa is a 77year old male presents for a follow up visit. Patient was last seen on 3/3/2020. INTERVAL HISTORY: Here for follow-up of prostate cancer. Doing well.  PSA 9/2020 remains undetectab adenoCa, , bilateral, + KALEB, +PNI, - SVI, close but negative surgical margins, 0/4 LNs. pT3a N0.     - 3/2020 F/U: PSA undetectable. Patient reports being nearly completely continent. Using 1 pad daily for safety.  No longer doing Kegel exercises as he feel seminal vesicles, ampulla of vas; radical prostatectomy:   · Prostatic adenocarcinoma, Chidi score 7 (4+3), grade group 3.  · Tumor is seen bilaterally with predominant right side involvement.   · Tumor shows extraprostatic extension with multifocal perin post robotic assisted laparoscopic radical prostatectomy and bilateral pelvic lymph node dissection 12/2019. Final pathology revealing pT3aN0 disease with KALEB and PNI; negative margins.      No evidence of disease recurrence given recent PSA level from 02/

## 2020-09-22 DIAGNOSIS — E78.00 HYPERCHOLESTEREMIA: ICD-10-CM

## 2020-09-22 DIAGNOSIS — L65.9 ALOPECIA: ICD-10-CM

## 2020-09-22 DIAGNOSIS — I10 ESSENTIAL HYPERTENSION: ICD-10-CM

## 2020-09-22 RX ORDER — FINASTERIDE 1 MG/1
1 TABLET, FILM COATED ORAL DAILY
Qty: 90 TABLET | Refills: 0 | OUTPATIENT
Start: 2020-09-22

## 2020-09-22 RX ORDER — LISINOPRIL 30 MG/1
30 TABLET ORAL DAILY
Qty: 90 TABLET | Refills: 1 | OUTPATIENT
Start: 2020-09-22

## 2020-09-22 RX ORDER — SIMVASTATIN 20 MG
20 TABLET ORAL NIGHTLY
Qty: 90 TABLET | Refills: 3 | OUTPATIENT
Start: 2020-09-22

## 2020-09-28 ENCOUNTER — NURSE ONLY (OUTPATIENT)
Dept: FAMILY MEDICINE CLINIC | Facility: CLINIC | Age: 65
End: 2020-09-28
Payer: MEDICARE

## 2020-09-28 DIAGNOSIS — L65.9 ALOPECIA: ICD-10-CM

## 2020-09-28 PROCEDURE — 90662 IIV NO PRSV INCREASED AG IM: CPT | Performed by: FAMILY MEDICINE

## 2020-09-28 PROCEDURE — G0008 ADMIN INFLUENZA VIRUS VAC: HCPCS | Performed by: FAMILY MEDICINE

## 2020-09-28 RX ORDER — FINASTERIDE 1 MG/1
1 TABLET, FILM COATED ORAL DAILY
Qty: 90 TABLET | Refills: 0 | Status: SHIPPED | OUTPATIENT
Start: 2020-09-28 | End: 2020-12-16

## 2020-09-28 NOTE — PROGRESS NOTES
Patient came in today to get influenza vaccine, name and  of patient was verified and it was given on his R deltoid without complications.

## 2020-12-16 ENCOUNTER — OFFICE VISIT (OUTPATIENT)
Dept: FAMILY MEDICINE CLINIC | Facility: CLINIC | Age: 65
End: 2020-12-16
Payer: MEDICARE

## 2020-12-16 VITALS
DIASTOLIC BLOOD PRESSURE: 80 MMHG | HEIGHT: 71 IN | OXYGEN SATURATION: 98 % | HEART RATE: 60 BPM | BODY MASS INDEX: 26.04 KG/M2 | WEIGHT: 186 LBS | SYSTOLIC BLOOD PRESSURE: 132 MMHG

## 2020-12-16 DIAGNOSIS — I10 ESSENTIAL HYPERTENSION: Primary | ICD-10-CM

## 2020-12-16 DIAGNOSIS — E78.00 HYPERCHOLESTEREMIA: ICD-10-CM

## 2020-12-16 DIAGNOSIS — Z00.00 HEALTHCARE MAINTENANCE: ICD-10-CM

## 2020-12-16 DIAGNOSIS — L65.9 ALOPECIA: ICD-10-CM

## 2020-12-16 PROCEDURE — 99213 OFFICE O/P EST LOW 20 MIN: CPT | Performed by: FAMILY MEDICINE

## 2020-12-16 RX ORDER — LISINOPRIL 30 MG/1
30 TABLET ORAL DAILY
Qty: 90 TABLET | Refills: 1 | Status: SHIPPED | OUTPATIENT
Start: 2020-12-16 | End: 2021-06-17

## 2020-12-16 RX ORDER — FINASTERIDE 1 MG/1
1 TABLET, FILM COATED ORAL DAILY
Qty: 90 TABLET | Refills: 1 | Status: SHIPPED | OUTPATIENT
Start: 2020-12-16 | End: 2021-06-17

## 2020-12-16 NOTE — PROGRESS NOTES
HPI:   No chief complaint on file.       Christy Casillas is a 77year old male presenting for:    HTN/HLD  -Taking meds as prescribed-tolerating well without SEs.   -Denies CP, Palpitations, Dizziness, leg edema, SOB, LOC, Has  -Home BP readings are at g W/NERVE SPARING  12/30/2019    RALP + PLND by Dr. Surjit Pina @ [address] [place] [address] (pT3a N0)   • XI ROBOT-ASSISTED LAPAROSCOPIC PROSTATECTOMY/ORICAL N/A 12/30/2019    Performed by Ander Berrios MD at [address] [place] [address] MAIN OR     No Known Allergies   Social History:  Social History exhibits no distension. There is no abdominal tenderness. Neurological: No cranial nerve deficit. Skin: No rash noted.            ASSESSMENT AND PLAN:   Patient is a 77year old male who presents primarily presents for:    Diagnoses and all orders for t

## 2021-03-04 DIAGNOSIS — Z23 NEED FOR VACCINATION: ICD-10-CM

## 2021-03-08 ENCOUNTER — IMMUNIZATION (OUTPATIENT)
Dept: LAB | Age: 66
End: 2021-03-08
Attending: HOSPITALIST
Payer: MEDICARE

## 2021-03-08 DIAGNOSIS — Z23 NEED FOR VACCINATION: Primary | ICD-10-CM

## 2021-03-08 PROCEDURE — 0001A SARSCOV2 VAC 30MCG/0.3ML IM: CPT

## 2021-03-17 ENCOUNTER — LAB ENCOUNTER (OUTPATIENT)
Dept: LAB | Facility: HOSPITAL | Age: 66
End: 2021-03-17
Attending: UROLOGY
Payer: MEDICARE

## 2021-03-17 DIAGNOSIS — C61 PROSTATE CANCER (HCC): ICD-10-CM

## 2021-03-17 LAB — PSA SERPL-MCNC: <0.01 NG/ML (ref ?–4)

## 2021-03-17 PROCEDURE — 36415 COLL VENOUS BLD VENIPUNCTURE: CPT

## 2021-03-17 PROCEDURE — 84153 ASSAY OF PSA TOTAL: CPT

## 2021-03-22 ENCOUNTER — OFFICE VISIT (OUTPATIENT)
Dept: SURGERY | Facility: CLINIC | Age: 66
End: 2021-03-22
Payer: MEDICARE

## 2021-03-22 VITALS
BODY MASS INDEX: 26 KG/M2 | SYSTOLIC BLOOD PRESSURE: 151 MMHG | WEIGHT: 186 LBS | DIASTOLIC BLOOD PRESSURE: 80 MMHG | HEART RATE: 61 BPM

## 2021-03-22 DIAGNOSIS — C61 PROSTATE CANCER (HCC): Primary | ICD-10-CM

## 2021-03-22 PROCEDURE — 99213 OFFICE O/P EST LOW 20 MIN: CPT | Performed by: UROLOGY

## 2021-03-22 NOTE — PROGRESS NOTES
[address] [facility] Urology  Follow-Up Visit    HPI: China Denson is a 77year old male presents for a follow up visit. Patient was last seen on 9/3/2020. INTERVAL HISTORY: Here for follow-up of prostate cancer. Doing well; feels 'great'.  PSA 3/17/2021 bilateral, + KALEB, +PNI, - SVI, close but negative surgical margins, 0/4 LNs. pT3a N0.     - 3/2020 F/U: PSA undetectable. Patient reports being nearly completely continent. Using 1 pad daily for safety.  No longer doing Kegel exercises as he feels he doesn' A. Prostate, bilateral seminal vesicles, ampulla of vas; radical prostatectomy:   · Prostatic adenocarcinoma, Chidi score 7 (4+3), grade group 3.  · Tumor is seen bilaterally with predominant right side involvement.   · Tumor shows extraprostatic exte prostate cancer diagnosed 9/2019. Status post robotic assisted laparoscopic radical prostatectomy and bilateral pelvic lymph node dissection 12/2019. Final pathology revealing pT3aN0 disease with KALEB and PNI; negative margins.      No evidence of disease re

## 2021-03-29 ENCOUNTER — IMMUNIZATION (OUTPATIENT)
Dept: LAB | Age: 66
End: 2021-03-29
Attending: HOSPITALIST
Payer: MEDICARE

## 2021-03-29 DIAGNOSIS — Z23 NEED FOR VACCINATION: Primary | ICD-10-CM

## 2021-03-29 PROCEDURE — 0002A SARSCOV2 VAC 30MCG/0.3ML IM: CPT

## 2021-06-15 ENCOUNTER — LAB ENCOUNTER (OUTPATIENT)
Dept: LAB | Facility: HOSPITAL | Age: 66
End: 2021-06-15
Attending: FAMILY MEDICINE
Payer: MEDICARE

## 2021-06-15 DIAGNOSIS — Z00.00 HEALTHCARE MAINTENANCE: ICD-10-CM

## 2021-06-15 DIAGNOSIS — I10 ESSENTIAL HYPERTENSION: ICD-10-CM

## 2021-06-15 DIAGNOSIS — E78.00 HYPERCHOLESTEREMIA: ICD-10-CM

## 2021-06-15 PROCEDURE — 36415 COLL VENOUS BLD VENIPUNCTURE: CPT | Performed by: FAMILY MEDICINE

## 2021-06-15 PROCEDURE — 80053 COMPREHEN METABOLIC PANEL: CPT

## 2021-06-15 PROCEDURE — 80061 LIPID PANEL: CPT

## 2021-06-15 PROCEDURE — 85025 COMPLETE CBC W/AUTO DIFF WBC: CPT | Performed by: FAMILY MEDICINE

## 2021-06-17 ENCOUNTER — OFFICE VISIT (OUTPATIENT)
Dept: FAMILY MEDICINE CLINIC | Facility: CLINIC | Age: 66
End: 2021-06-17
Payer: MEDICARE

## 2021-06-17 VITALS
BODY MASS INDEX: 25.76 KG/M2 | SYSTOLIC BLOOD PRESSURE: 138 MMHG | OXYGEN SATURATION: 97 % | HEART RATE: 73 BPM | WEIGHT: 184 LBS | DIASTOLIC BLOOD PRESSURE: 70 MMHG | HEIGHT: 71 IN

## 2021-06-17 DIAGNOSIS — D64.9 MILD CHRONIC ANEMIA: ICD-10-CM

## 2021-06-17 DIAGNOSIS — E66.3 OVERWEIGHT (BMI 25.0-29.9): ICD-10-CM

## 2021-06-17 DIAGNOSIS — L65.9 ALOPECIA: ICD-10-CM

## 2021-06-17 DIAGNOSIS — H91.90 HEARING LOSS, UNSPECIFIED HEARING LOSS TYPE, UNSPECIFIED LATERALITY: ICD-10-CM

## 2021-06-17 DIAGNOSIS — Z85.46 HISTORY OF PROSTATE CANCER: ICD-10-CM

## 2021-06-17 DIAGNOSIS — D69.6 THROMBOCYTOPENIA (HCC): ICD-10-CM

## 2021-06-17 DIAGNOSIS — E78.00 HYPERCHOLESTEREMIA: ICD-10-CM

## 2021-06-17 DIAGNOSIS — Z00.00 ENCOUNTER FOR ANNUAL HEALTH EXAMINATION: Primary | ICD-10-CM

## 2021-06-17 DIAGNOSIS — I10 ESSENTIAL HYPERTENSION: ICD-10-CM

## 2021-06-17 PROCEDURE — G0439 PPPS, SUBSEQ VISIT: HCPCS | Performed by: FAMILY MEDICINE

## 2021-06-17 PROCEDURE — 99213 OFFICE O/P EST LOW 20 MIN: CPT | Performed by: FAMILY MEDICINE

## 2021-06-17 RX ORDER — LISINOPRIL 30 MG/1
30 TABLET ORAL DAILY
Qty: 90 TABLET | Refills: 1 | Status: SHIPPED | OUTPATIENT
Start: 2021-06-17 | End: 2021-12-16

## 2021-06-17 RX ORDER — SIMVASTATIN 20 MG
20 TABLET ORAL NIGHTLY
Qty: 90 TABLET | Refills: 3 | Status: SHIPPED | OUTPATIENT
Start: 2021-06-17

## 2021-06-17 RX ORDER — FINASTERIDE 1 MG/1
1 TABLET, FILM COATED ORAL DAILY
Qty: 90 TABLET | Refills: 1 | Status: SHIPPED | OUTPATIENT
Start: 2021-06-17 | End: 2021-12-13

## 2021-06-27 PROBLEM — Z85.46 HISTORY OF PROSTATE CANCER: Status: ACTIVE | Noted: 2019-09-30

## 2021-06-28 NOTE — PATIENT INSTRUCTIONS
Prabha Brewer's SCREENING SCHEDULE   Tests on this list are recommended by your physician but may not be covered, or covered at this frequency, by your insurer. Please check with your insurance carrier before scheduling to verify coverage.    PREVENT Pneumococcal Each vaccine ([id number] & [id number]) covered once after 65 Prevnar 13: 08/15/2019    [id number]: 06/16/2020     No recommendations at this time    Hepatitis B One screening covered for patients with certain risk factors   -  No recommendati

## 2021-06-28 NOTE — PROGRESS NOTES
HPI:   Lainey Nagy is a 74year old male who presents for a Medicare Subsequent Annual Wellness visit (Pt already had Initial Annual Wellness). No acute complaints at this time  BP at home 100-130's.   His last annual assessment has been over 1 ye Medicine)    Patient Active Problem List:     Essential hypertension     Hypercholesteremia     Thrombocytopenia (HCC)     Overweight (BMI 25.0-29. 9)     Hearing loss     History of prostate cancer     Mild chronic anemia    Wt Readings from Last 3 Encount that he does not use drugs. REVIEW OF SYSTEMS:   Review of Systems   Constitutional: Negative for fatigue and fever. Respiratory: Negative for cough and shortness of breath. Cardiovascular: Negative for chest pain, palpitations and leg swelling. Dose 65 YRS & Older PRSV Free (35473) 09/28/2020   • FLUAD High Dose 65 yr and older (49187) 11/19/2018, 08/15/2019   • FLUZONE 6 months and older PFS 0.5 ml (50962) 02/09/2017, 09/11/2017   • Pneumococcal (Prevnar 13) 08/15/2019   • Pneumovax 23 06/16/202 medication ordered. Patient reassured. Continue with current treatment plan. Return in about 6 months (around 12/17/2021) for Blood Pressure follow-up. Return in about 6 months (around 12/17/2021) for Blood Pressure follow-up.   Reasurrance and educa found.    Glaucoma Screening      Ophthalmology Visit Annually: Diabetics, FHx Glaucoma, AA>50, > 65 No flowsheet data found.     Prostate Cancer Screening      PSA  Annually PSA due on 03/17/2023  Update Health Maintenance if applicable     Immuniz

## 2021-07-19 ENCOUNTER — APPOINTMENT (OUTPATIENT)
Dept: OTHER | Facility: HOSPITAL | Age: 66
End: 2021-07-19
Attending: ORTHOPAEDIC SURGERY

## 2021-07-20 ENCOUNTER — ORDER TRANSCRIPTION (OUTPATIENT)
Dept: ADMINISTRATIVE | Facility: HOSPITAL | Age: 66
End: 2021-07-20

## 2021-07-20 DIAGNOSIS — Z13.6 SCREENING FOR CARDIOVASCULAR CONDITION: Primary | ICD-10-CM

## 2021-07-20 DIAGNOSIS — Z13.9 ENCOUNTER FOR SCREENING: Primary | ICD-10-CM

## 2021-07-26 ENCOUNTER — HOSPITAL ENCOUNTER (OUTPATIENT)
Dept: ULTRASOUND IMAGING | Facility: HOSPITAL | Age: 66
Discharge: HOME | End: 2021-07-26
Attending: FAMILY MEDICINE

## 2021-07-26 DIAGNOSIS — Z13.9 ENCOUNTER FOR SCREENING: ICD-10-CM

## 2021-07-28 ENCOUNTER — TELEPHONE (OUTPATIENT)
Dept: FAMILY MEDICINE CLINIC | Facility: CLINIC | Age: 66
End: 2021-07-28

## 2021-07-28 NOTE — TELEPHONE ENCOUNTER
----- Message from Gerardo Zhu MD sent at 7/26/2021  5:40 PM CDT -----  Oma yuen him know overall study was benign. He has mild plaque buildup in the neck arteries.  He is to continue taking his cholesterol medication to prevent this from pro

## 2021-08-03 ENCOUNTER — HOSPITAL ENCOUNTER (OUTPATIENT)
Dept: CT IMAGING | Age: 66
Discharge: HOME | End: 2021-08-03
Attending: FAMILY MEDICINE

## 2021-08-03 DIAGNOSIS — Z13.6 SCREENING FOR CARDIOVASCULAR CONDITION: ICD-10-CM

## 2021-09-01 ENCOUNTER — APPOINTMENT (OUTPATIENT)
Dept: GENERAL RADIOLOGY | Age: 66
DRG: 482 | End: 2021-09-01
Attending: NURSE PRACTITIONER
Payer: MEDICARE

## 2021-09-01 ENCOUNTER — HOSPITAL ENCOUNTER (INPATIENT)
Facility: HOSPITAL | Age: 66
LOS: 2 days | Discharge: HOME | DRG: 482 | End: 2021-09-03
Attending: HOSPITALIST | Admitting: HOSPITALIST
Payer: MEDICARE

## 2021-09-01 ENCOUNTER — HOSPITAL ENCOUNTER (OUTPATIENT)
Age: 66
Discharge: ANOTHER HOSPITAL | DRG: 482 | End: 2021-09-01
Payer: MEDICARE

## 2021-09-01 VITALS
HEART RATE: 56 BPM | RESPIRATION RATE: 18 BRPM | HEIGHT: 70 IN | BODY MASS INDEX: 25.77 KG/M2 | WEIGHT: 180 LBS | TEMPERATURE: 98 F | DIASTOLIC BLOOD PRESSURE: 76 MMHG | SYSTOLIC BLOOD PRESSURE: 150 MMHG

## 2021-09-01 DIAGNOSIS — S72.002A CLOSED FRACTURE OF NECK OF LEFT FEMUR, INITIAL ENCOUNTER (HCC): ICD-10-CM
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
## 2021-09-01 PROBLEM — S72.009A HIP FRACTURE (HCC): Status: ACTIVE | Noted: 2021-09-01

## 2021-09-01 LAB
#MXD IC: 0.4 X10ˆ3/UL (ref 0.1–1)
BILIRUB UR QL: NEGATIVE
BUN BLD-MCNC: 12 MG/DL (ref 7–18)
CHLORIDE BLD-SCNC: 104 MMOL/L (ref 98–112)
CLARITY UR: CLEAR
CO2 BLD-SCNC: 22 MMOL/L (ref 21–32)
COLOR UR: COLORLESS
CREAT BLD-MCNC: 0.8 MG/DL
GLUCOSE BLD-MCNC: 95 MG/DL (ref 70–99)
GLUCOSE UR-MCNC: NEGATIVE MG/DL
HCT VFR BLD AUTO: 35.5 %
HCT VFR BLD CALC: 37 %
HGB BLD-MCNC: 11.5 G/DL
HGB UR QL STRIP.AUTO: NEGATIVE
ISTAT IONIZED CALCIUM FOR CHEM 8: 1.22 MMOL/L (ref 1.12–1.32)
LEUKOCYTE ESTERASE UR QL STRIP.AUTO: NEGATIVE
LYMPHOCYTES # BLD AUTO: 0.4 X10ˆ3/UL (ref 1–4)
LYMPHOCYTES NFR BLD AUTO: 4.8 %
MCH RBC QN AUTO: 30.6 PG (ref 26–34)
MCHC RBC AUTO-ENTMCNC: 32.4 G/DL (ref 31–37)
MCV RBC AUTO: 94.4 FL (ref 80–100)
MIXED CELL %: 4.3 %
NEUTROPHILS # BLD AUTO: 8.1 X10ˆ3/UL (ref 1.5–7.7)
NEUTROPHILS NFR BLD AUTO: 90.9 %
NITRITE UR QL STRIP.AUTO: NEGATIVE
PH UR: 6 [PH] (ref 5–8)
PLATELET # BLD AUTO: 137 X10ˆ3/UL (ref 150–450)
POTASSIUM BLD-SCNC: 3.3 MMOL/L (ref 3.6–5.1)
PROT UR-MCNC: NEGATIVE MG/DL
RBC # BLD AUTO: 3.76 X10ˆ6/UL
SARS-COV-2 RNA RESP QL NAA+PROBE: NOT DETECTED
SODIUM BLD-SCNC: 142 MMOL/L (ref 136–145)
SP GR UR STRIP: 1 (ref 1–1.03)
UROBILINOGEN UR STRIP-ACNC: <2
WBC # BLD AUTO: 8.9 X10ˆ3/UL (ref 4–11)

## 2021-09-01 PROCEDURE — 80047 BASIC METABLC PNL IONIZED CA: CPT | Performed by: NURSE PRACTITIONER

## 2021-09-01 PROCEDURE — 99215 OFFICE O/P EST HI 40 MIN: CPT | Performed by: NURSE PRACTITIONER

## 2021-09-01 PROCEDURE — 73552 X-RAY EXAM OF FEMUR 2/>: CPT | Performed by: NURSE PRACTITIONER

## 2021-09-01 PROCEDURE — 73502 X-RAY EXAM HIP UNI 2-3 VIEWS: CPT | Performed by: NURSE PRACTITIONER

## 2021-09-01 PROCEDURE — U0002 COVID-19 LAB TEST NON-CDC: HCPCS | Performed by: NURSE PRACTITIONER

## 2021-09-01 PROCEDURE — 85025 COMPLETE CBC W/AUTO DIFF WBC: CPT | Performed by: NURSE PRACTITIONER

## 2021-09-01 PROCEDURE — 99222 1ST HOSP IP/OBS MODERATE 55: CPT | Performed by: HOSPITALIST

## 2021-09-01 RX ORDER — DOCUSATE SODIUM 100 MG/1
100 CAPSULE, LIQUID FILLED ORAL 2 TIMES DAILY
Status: DISCONTINUED | OUTPATIENT
Start: 2021-09-01 | End: 2021-09-03

## 2021-09-01 RX ORDER — HYDROCODONE BITARTRATE AND ACETAMINOPHEN 5; 325 MG/1; MG/1
2 TABLET ORAL EVERY 4 HOURS PRN
Status: DISCONTINUED | OUTPATIENT
Start: 2021-09-01 | End: 2021-09-03

## 2021-09-01 RX ORDER — ONDANSETRON 2 MG/ML
4 INJECTION INTRAMUSCULAR; INTRAVENOUS EVERY 6 HOURS PRN
Status: DISCONTINUED | OUTPATIENT
Start: 2021-09-01 | End: 2021-09-03

## 2021-09-01 RX ORDER — METOCLOPRAMIDE HYDROCHLORIDE 5 MG/ML
10 INJECTION INTRAMUSCULAR; INTRAVENOUS EVERY 8 HOURS PRN
Status: DISCONTINUED | OUTPATIENT
Start: 2021-09-01 | End: 2021-09-03

## 2021-09-01 RX ORDER — POLYETHYLENE GLYCOL 3350 17 G/17G
17 POWDER, FOR SOLUTION ORAL DAILY PRN
Status: DISCONTINUED | OUTPATIENT
Start: 2021-09-01 | End: 2021-09-03

## 2021-09-01 RX ORDER — HYDROCODONE BITARTRATE AND ACETAMINOPHEN 5; 325 MG/1; MG/1
1 TABLET ORAL EVERY 4 HOURS PRN
Status: DISCONTINUED | OUTPATIENT
Start: 2021-09-01 | End: 2021-09-03

## 2021-09-01 RX ORDER — BISACODYL 10 MG
10 SUPPOSITORY, RECTAL RECTAL
Status: DISCONTINUED | OUTPATIENT
Start: 2021-09-01 | End: 2021-09-03

## 2021-09-01 RX ORDER — MORPHINE SULFATE 2 MG/ML
1 INJECTION, SOLUTION INTRAMUSCULAR; INTRAVENOUS EVERY 2 HOUR PRN
Status: DISCONTINUED | OUTPATIENT
Start: 2021-09-01 | End: 2021-09-03

## 2021-09-01 RX ORDER — ATORVASTATIN CALCIUM 10 MG/1
10 TABLET, FILM COATED ORAL NIGHTLY
Refills: 3 | Status: DISCONTINUED | OUTPATIENT
Start: 2021-09-01 | End: 2021-09-03

## 2021-09-01 RX ORDER — CEFAZOLIN SODIUM/WATER 2 G/20 ML
2 SYRINGE (ML) INTRAVENOUS
Status: COMPLETED | OUTPATIENT
Start: 2021-09-02 | End: 2021-09-02

## 2021-09-01 RX ORDER — ACETAMINOPHEN 325 MG/1
650 TABLET ORAL EVERY 4 HOURS PRN
Status: DISCONTINUED | OUTPATIENT
Start: 2021-09-01 | End: 2021-09-03

## 2021-09-01 RX ORDER — MORPHINE SULFATE 4 MG/ML
4 INJECTION, SOLUTION INTRAMUSCULAR; INTRAVENOUS EVERY 2 HOUR PRN
Status: DISCONTINUED | OUTPATIENT
Start: 2021-09-01 | End: 2021-09-03

## 2021-09-01 RX ORDER — MORPHINE SULFATE 2 MG/ML
2 INJECTION, SOLUTION INTRAMUSCULAR; INTRAVENOUS EVERY 2 HOUR PRN
Status: DISCONTINUED | OUTPATIENT
Start: 2021-09-01 | End: 2021-09-03

## 2021-09-01 RX ORDER — LISINOPRIL 40 MG/1
40 TABLET ORAL DAILY
Status: DISCONTINUED | OUTPATIENT
Start: 2021-09-01 | End: 2021-09-03

## 2021-09-01 RX ORDER — SODIUM CHLORIDE, SODIUM LACTATE, POTASSIUM CHLORIDE, CALCIUM CHLORIDE 600; 310; 30; 20 MG/100ML; MG/100ML; MG/100ML; MG/100ML
INJECTION, SOLUTION INTRAVENOUS CONTINUOUS
Status: DISCONTINUED | OUTPATIENT
Start: 2021-09-02 | End: 2021-09-03

## 2021-09-01 NOTE — Clinical Note
Transferred to [address] [place] [address] Emergency Department for a higher level of care.
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
## 2021-09-02 ENCOUNTER — APPOINTMENT (OUTPATIENT)
Dept: GENERAL RADIOLOGY | Facility: HOSPITAL | Age: 66
DRG: 482 | End: 2021-09-02
Attending: ORTHOPAEDIC SURGERY
Payer: MEDICARE

## 2021-09-02 ENCOUNTER — ANESTHESIA EVENT (OUTPATIENT)
Dept: SURGERY | Facility: HOSPITAL | Age: 66
DRG: 482 | End: 2021-09-02
Payer: MEDICARE

## 2021-09-02 ENCOUNTER — ANESTHESIA (OUTPATIENT)
Dept: SURGERY | Facility: HOSPITAL | Age: 66
DRG: 482 | End: 2021-09-02
Payer: MEDICARE

## 2021-09-02 LAB
ANION GAP SERPL CALC-SCNC: 3 MMOL/L (ref 0–18)
ANTIBODY SCREEN: NEGATIVE
BASOPHILS # BLD AUTO: 0.03 X10(3) UL (ref 0–0.2)
BASOPHILS NFR BLD AUTO: 0.5 %
BUN BLD-MCNC: 11 MG/DL (ref 7–18)
BUN/CREAT SERPL: 14.3 (ref 10–20)
CALCIUM BLD-MCNC: 8.3 MG/DL (ref 8.5–10.1)
CHLORIDE SERPL-SCNC: 112 MMOL/L (ref 98–112)
CO2 SERPL-SCNC: 27 MMOL/L (ref 21–32)
CREAT BLD-MCNC: 0.77 MG/DL
DEPRECATED RDW RBC AUTO: 43.6 FL (ref 35.1–46.3)
EOSINOPHIL # BLD AUTO: 0.19 X10(3) UL (ref 0–0.7)
EOSINOPHIL NFR BLD AUTO: 2.9 %
ERYTHROCYTE [DISTWIDTH] IN BLOOD BY AUTOMATED COUNT: 12.5 % (ref 11–15)
GLUCOSE BLD-MCNC: 95 MG/DL (ref 70–99)
HAV IGM SER QL: 2 MG/DL (ref 1.6–2.6)
HCT VFR BLD AUTO: 34.3 %
HGB BLD-MCNC: 11 G/DL
IMM GRANULOCYTES # BLD AUTO: 0.01 X10(3) UL (ref 0–1)
IMM GRANULOCYTES NFR BLD: 0.2 %
LYMPHOCYTES # BLD AUTO: 0.64 X10(3) UL (ref 1–4)
LYMPHOCYTES NFR BLD AUTO: 9.8 %
MCH RBC QN AUTO: 30.5 PG (ref 26–34)
MCHC RBC AUTO-ENTMCNC: 32.1 G/DL (ref 31–37)
MCV RBC AUTO: 95 FL
MONOCYTES # BLD AUTO: 0.46 X10(3) UL (ref 0.1–1)
MONOCYTES NFR BLD AUTO: 7 %
MRSA DNA SPEC QL NAA+PROBE: NEGATIVE
NEUTROPHILS # BLD AUTO: 5.22 X10 (3) UL (ref 1.5–7.7)
NEUTROPHILS # BLD AUTO: 5.22 X10(3) UL (ref 1.5–7.7)
NEUTROPHILS NFR BLD AUTO: 79.6 %
OSMOLALITY SERPL CALC.SUM OF ELEC: 293 MOSM/KG (ref 275–295)
PLATELET # BLD AUTO: 121 10(3)UL (ref 150–450)
POTASSIUM SERPL-SCNC: 3.6 MMOL/L (ref 3.5–5.1)
RBC # BLD AUTO: 3.61 X10(6)UL
RH BLOOD TYPE: POSITIVE
SODIUM SERPL-SCNC: 142 MMOL/L (ref 136–145)
WBC # BLD AUTO: 6.6 X10(3) UL (ref 4–11)

## 2021-09-02 PROCEDURE — 99233 SBSQ HOSP IP/OBS HIGH 50: CPT | Performed by: HOSPITALIST

## 2021-09-02 PROCEDURE — 0QH734Z INSERTION OF INTERNAL FIXATION DEVICE INTO LEFT UPPER FEMUR, PERCUTANEOUS APPROACH: ICD-10-PCS | Performed by: ORTHOPAEDIC SURGERY

## 2021-09-02 PROCEDURE — 27235 TREAT THIGH FRACTURE: CPT | Performed by: ORTHOPAEDIC SURGERY

## 2021-09-02 PROCEDURE — 76000 FLUOROSCOPY <1 HR PHYS/QHP: CPT | Performed by: ORTHOPAEDIC SURGERY

## 2021-09-02 PROCEDURE — 99221 1ST HOSP IP/OBS SF/LOW 40: CPT | Performed by: ORTHOPAEDIC SURGERY

## 2021-09-02 DEVICE — SCREW CANN PT 7.3X90 208.890: Type: IMPLANTABLE DEVICE | Site: HIP | Status: FUNCTIONAL

## 2021-09-02 DEVICE — IMPLANTABLE DEVICE: Type: IMPLANTABLE DEVICE | Site: HIP | Status: FUNCTIONAL

## 2021-09-02 RX ORDER — ONDANSETRON 2 MG/ML
4 INJECTION INTRAMUSCULAR; INTRAVENOUS EVERY 4 HOURS PRN
Status: DISCONTINUED | OUTPATIENT
Start: 2021-09-02 | End: 2021-09-02

## 2021-09-02 RX ORDER — PROCHLORPERAZINE EDISYLATE 5 MG/ML
5 INJECTION INTRAMUSCULAR; INTRAVENOUS ONCE AS NEEDED
Status: DISCONTINUED | OUTPATIENT
Start: 2021-09-02 | End: 2021-09-02 | Stop reason: HOSPADM

## 2021-09-02 RX ORDER — FINASTERIDE 5 MG/1
5 TABLET, FILM COATED ORAL NIGHTLY
Status: DISCONTINUED | OUTPATIENT
Start: 2021-09-02 | End: 2021-09-03

## 2021-09-02 RX ORDER — NEOSTIGMINE METHYLSULFATE 1 MG/ML
INJECTION INTRAVENOUS AS NEEDED
Status: DISCONTINUED | OUTPATIENT
Start: 2021-09-02 | End: 2021-09-02 | Stop reason: SURG

## 2021-09-02 RX ORDER — HYDROMORPHONE HYDROCHLORIDE 1 MG/ML
0.4 INJECTION, SOLUTION INTRAMUSCULAR; INTRAVENOUS; SUBCUTANEOUS EVERY 5 MIN PRN
Status: DISCONTINUED | OUTPATIENT
Start: 2021-09-02 | End: 2021-09-02 | Stop reason: HOSPADM

## 2021-09-02 RX ORDER — ONDANSETRON 2 MG/ML
4 INJECTION INTRAMUSCULAR; INTRAVENOUS ONCE AS NEEDED
Status: DISCONTINUED | OUTPATIENT
Start: 2021-09-02 | End: 2021-09-02 | Stop reason: HOSPADM

## 2021-09-02 RX ORDER — HYDROCODONE BITARTRATE AND ACETAMINOPHEN 5; 325 MG/1; MG/1
1 TABLET ORAL AS NEEDED
Status: DISCONTINUED | OUTPATIENT
Start: 2021-09-02 | End: 2021-09-02 | Stop reason: HOSPADM

## 2021-09-02 RX ORDER — HYDROMORPHONE HYDROCHLORIDE 1 MG/ML
0.2 INJECTION, SOLUTION INTRAMUSCULAR; INTRAVENOUS; SUBCUTANEOUS EVERY 5 MIN PRN
Status: DISCONTINUED | OUTPATIENT
Start: 2021-09-02 | End: 2021-09-02 | Stop reason: HOSPADM

## 2021-09-02 RX ORDER — EPHEDRINE SULFATE 50 MG/ML
INJECTION, SOLUTION INTRAVENOUS AS NEEDED
Status: DISCONTINUED | OUTPATIENT
Start: 2021-09-02 | End: 2021-09-02 | Stop reason: SURG

## 2021-09-02 RX ORDER — GLYCOPYRROLATE 0.2 MG/ML
INJECTION, SOLUTION INTRAMUSCULAR; INTRAVENOUS AS NEEDED
Status: DISCONTINUED | OUTPATIENT
Start: 2021-09-02 | End: 2021-09-02 | Stop reason: SURG

## 2021-09-02 RX ORDER — NALOXONE HYDROCHLORIDE 0.4 MG/ML
80 INJECTION, SOLUTION INTRAMUSCULAR; INTRAVENOUS; SUBCUTANEOUS AS NEEDED
Status: DISCONTINUED | OUTPATIENT
Start: 2021-09-02 | End: 2021-09-02 | Stop reason: HOSPADM

## 2021-09-02 RX ORDER — CEFAZOLIN SODIUM/WATER 2 G/20 ML
2 SYRINGE (ML) INTRAVENOUS EVERY 8 HOURS
Status: COMPLETED | OUTPATIENT
Start: 2021-09-03 | End: 2021-09-03

## 2021-09-02 RX ORDER — ROCURONIUM BROMIDE 10 MG/ML
INJECTION, SOLUTION INTRAVENOUS AS NEEDED
Status: DISCONTINUED | OUTPATIENT
Start: 2021-09-02 | End: 2021-09-02 | Stop reason: SURG

## 2021-09-02 RX ORDER — ONDANSETRON 2 MG/ML
INJECTION INTRAMUSCULAR; INTRAVENOUS AS NEEDED
Status: DISCONTINUED | OUTPATIENT
Start: 2021-09-02 | End: 2021-09-02 | Stop reason: SURG

## 2021-09-02 RX ORDER — HYDROCODONE BITARTRATE AND ACETAMINOPHEN 5; 325 MG/1; MG/1
2 TABLET ORAL AS NEEDED
Status: DISCONTINUED | OUTPATIENT
Start: 2021-09-02 | End: 2021-09-02 | Stop reason: HOSPADM

## 2021-09-02 RX ORDER — HYDROMORPHONE HYDROCHLORIDE 1 MG/ML
0.6 INJECTION, SOLUTION INTRAMUSCULAR; INTRAVENOUS; SUBCUTANEOUS EVERY 5 MIN PRN
Status: DISCONTINUED | OUTPATIENT
Start: 2021-09-02 | End: 2021-09-02 | Stop reason: HOSPADM

## 2021-09-02 RX ORDER — MORPHINE SULFATE 4 MG/ML
4 INJECTION, SOLUTION INTRAMUSCULAR; INTRAVENOUS EVERY 10 MIN PRN
Status: DISCONTINUED | OUTPATIENT
Start: 2021-09-02 | End: 2021-09-02 | Stop reason: HOSPADM

## 2021-09-02 RX ORDER — DOXEPIN HYDROCHLORIDE 50 MG/1
1 CAPSULE ORAL DAILY
Status: DISCONTINUED | OUTPATIENT
Start: 2021-09-03 | End: 2021-09-03

## 2021-09-02 RX ORDER — MORPHINE SULFATE 10 MG/ML
6 INJECTION, SOLUTION INTRAMUSCULAR; INTRAVENOUS EVERY 10 MIN PRN
Status: DISCONTINUED | OUTPATIENT
Start: 2021-09-02 | End: 2021-09-02 | Stop reason: HOSPADM

## 2021-09-02 RX ORDER — DEXAMETHASONE SODIUM PHOSPHATE 4 MG/ML
VIAL (ML) INJECTION AS NEEDED
Status: DISCONTINUED | OUTPATIENT
Start: 2021-09-02 | End: 2021-09-02 | Stop reason: SURG

## 2021-09-02 RX ORDER — DOCUSATE SODIUM 100 MG/1
100 CAPSULE, LIQUID FILLED ORAL 2 TIMES DAILY
Status: DISCONTINUED | OUTPATIENT
Start: 2021-09-02 | End: 2021-09-02

## 2021-09-02 RX ORDER — LIDOCAINE HYDROCHLORIDE 10 MG/ML
INJECTION, SOLUTION EPIDURAL; INFILTRATION; INTRACAUDAL; PERINEURAL AS NEEDED
Status: DISCONTINUED | OUTPATIENT
Start: 2021-09-02 | End: 2021-09-02 | Stop reason: SURG

## 2021-09-02 RX ORDER — ASPIRIN 325 MG
325 TABLET, DELAYED RELEASE (ENTERIC COATED) ORAL 2 TIMES DAILY
Status: DISCONTINUED | OUTPATIENT
Start: 2021-09-02 | End: 2021-09-03

## 2021-09-02 RX ORDER — MIDAZOLAM HYDROCHLORIDE 1 MG/ML
INJECTION INTRAMUSCULAR; INTRAVENOUS AS NEEDED
Status: DISCONTINUED | OUTPATIENT
Start: 2021-09-02 | End: 2021-09-02 | Stop reason: SURG

## 2021-09-02 RX ORDER — MORPHINE SULFATE 4 MG/ML
2 INJECTION, SOLUTION INTRAMUSCULAR; INTRAVENOUS EVERY 10 MIN PRN
Status: DISCONTINUED | OUTPATIENT
Start: 2021-09-02 | End: 2021-09-02 | Stop reason: HOSPADM

## 2021-09-02 RX ORDER — SODIUM CHLORIDE, SODIUM LACTATE, POTASSIUM CHLORIDE, CALCIUM CHLORIDE 600; 310; 30; 20 MG/100ML; MG/100ML; MG/100ML; MG/100ML
INJECTION, SOLUTION INTRAVENOUS CONTINUOUS
Status: DISCONTINUED | OUTPATIENT
Start: 2021-09-02 | End: 2021-09-02 | Stop reason: HOSPADM

## 2021-09-02 RX ORDER — BISACODYL 10 MG
10 SUPPOSITORY, RECTAL RECTAL
Status: DISCONTINUED | OUTPATIENT
Start: 2021-09-02 | End: 2021-09-02

## 2021-09-02 RX ADMIN — ROCURONIUM BROMIDE 40 MG: 10 INJECTION, SOLUTION INTRAVENOUS at 17:38:00

## 2021-09-02 RX ADMIN — NEOSTIGMINE METHYLSULFATE 4 MG: 1 INJECTION INTRAVENOUS at 18:39:00

## 2021-09-02 RX ADMIN — EPHEDRINE SULFATE 10 MG: 50 INJECTION, SOLUTION INTRAVENOUS at 17:57:00

## 2021-09-02 RX ADMIN — LIDOCAINE HYDROCHLORIDE 50 MG: 10 INJECTION, SOLUTION EPIDURAL; INFILTRATION; INTRACAUDAL; PERINEURAL at 17:38:00

## 2021-09-02 RX ADMIN — CEFAZOLIN SODIUM/WATER 2 G: 2 G/20 ML SYRINGE (ML) INTRAVENOUS at 17:42:00

## 2021-09-02 RX ADMIN — SODIUM CHLORIDE, SODIUM LACTATE, POTASSIUM CHLORIDE, CALCIUM CHLORIDE: 600; 310; 30; 20 INJECTION, SOLUTION INTRAVENOUS at 18:43:00

## 2021-09-02 RX ADMIN — GLYCOPYRROLATE 0.6 MG: 0.2 INJECTION, SOLUTION INTRAMUSCULAR; INTRAVENOUS at 18:39:00

## 2021-09-02 RX ADMIN — DEXAMETHASONE SODIUM PHOSPHATE 4 MG: 4 MG/ML VIAL (ML) INJECTION at 17:38:00

## 2021-09-02 RX ADMIN — MIDAZOLAM HYDROCHLORIDE 2 MG: 1 INJECTION INTRAMUSCULAR; INTRAVENOUS at 17:32:00

## 2021-09-02 RX ADMIN — ONDANSETRON 4 MG: 2 INJECTION INTRAMUSCULAR; INTRAVENOUS at 17:38:00

## 2021-09-02 RX ADMIN — GLYCOPYRROLATE 0.2 MG: 0.2 INJECTION, SOLUTION INTRAMUSCULAR; INTRAVENOUS at 17:38:00

## 2021-09-02 NOTE — CM/SW NOTE
Direct admission placed form [facility] today at 1pm for [address] patient has not arrived. I called his cell phone number twice earlier and left a message.     I called [facility] and they said patient refused ambulance to the hospital states his friend was going to bring him to the hospital.    [facility], [address] [facility] , [address] [place] [address] Leader  [phone]

## 2021-09-02 NOTE — ADDENDUM NOTE
Addendum  created 09/02/21 1853 by Eulis Boast, MD    Flowsheet accepted, Intraprocedure Flowsheets edited

## 2021-09-02 NOTE — ANESTHESIA PREPROCEDURE EVALUATION
Anesthesia PreOp Note    HPI:     Mika Ba is a 74year old male who presents for preoperative consultation requested by: Shital Mancera MD    Date of Surgery: 9/1/2021 - 9/2/2021    Procedure(s):  LEFT HIP PINNING  Indication: Closed fractur (ANCEF/KEFZOL) 2 GM/20ML premix IV syringe 2 g, 2 g, Intravenous, On Call to Yanna Moses MD  [facility] Hold] finasteride (PROSCAR) 1 mg/mL solution 1 mg, 1 mg, Oral, Nightly, Yenny Rubi MD, 1 mg at 09/02/21 0134  [MAR Hold] lisinopril tab 40 mg, [facility]-ordered outpatient medications on file. No Known Allergies    Family History   Problem Relation Age of Onset   • Heart Disorder Father         CFH   • Hypertension Mother    • Lipids Mother    • Cancer Mother         Breast Cancer, 1963 approx.     Fear of Current or Ex-Partner:       Emotionally Abused:       Physically Abused:       Sexually Abused:     Available pre-op labs reviewed.   Lab Results   Component Value Date    WBC 6.6 09/02/2021    RBC 3.61 (L) 09/02/2021    HGB 11.0 (L) 09/02/20 major complications, and any alternative forms of anesthetic management. All of the patient's questions were answered to the best of my ability. The patient desires the anesthetic management as planned.   Olena Spencer MD  9/2/2021 5:27 PM
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
## 2021-09-02 NOTE — PLAN OF CARE
Patient A/o x4. Vital signs stable, cms intact. Room air. No signs of acute distress. Patient on bedrest. NPO after breakfast. Surgery scheduled for dinner time. No complaints of pain. Only discomfort. Currently resting in bed, call light within reach, safety precautions maintained. Will continue to monitor.    -Patient went down for surgery. Belongings placed in safe.      Problem: Patient Centered Care  Goal: Patient preferences are identified and integrated in the patient's plan of care  Description: Interventions:  - What would you like us to know as we care for you?   - Provide timely, complete, and accurate information to patient/family  - Incorporate patient and family knowledge, values, beliefs, and cultural backgrounds into the planning and delivery of care  - Encourage patient/family to participate in care and decision-making at the level they choose  - Honor patient and family perspectives and choices  Outcome: Progressing     Problem: Patient/Family Goals  Goal: Patient/Family Long Term Goal  Description: Patient's Long Term Goal:     Interventions:  - See additional Care Plan goals for specific interventions  Outcome: Progressing  Goal: Patient/Family Short Term Goal  Description: Patient's Short Term Goal:     Interventions:   - See additional Care Plan goals for specific interventions  Outcome: Progressing     Problem: PAIN - ADULT  Goal: Verbalizes/displays adequate comfort level or patient's stated pain goal  Description: INTERVENTIONS:  - Encourage pt to monitor pain and request assistance  - Assess pain using appropriate pain scale  - Administer analgesics based on type and severity of pain and evaluate response  - Implement non-pharmacological measures as appropriate and evaluate response  - Consider cultural and social influences on pain and pain management  - Manage/alleviate anxiety  - Utilize distraction and/or relaxation techniques  - Monitor for opioid side effects  - Notify MD/LIP if interventions unsuccessful or patient reports new pain  - Anticipate increased pain with activity and pre-medicate as appropriate  Outcome: Progressing     Problem: RISK FOR INFECTION - ADULT  Goal: Absence of fever/infection during anticipated neutropenic period  Description: INTERVENTIONS  - Monitor WBC  - Administer growth factors as ordered  - Implement neutropenic guidelines  Outcome: Progressing     Problem: SAFETY ADULT - FALL  Goal: Free from fall injury  Description: INTERVENTIONS:  - Assess pt frequently for physical needs  - Identify cognitive and physical deficits and behaviors that affect risk of falls.   - [place] fall precautions as indicated by assessment.  - Educate pt/family on patient safety including physical limitations  - Instruct pt to call for assistance with activity based on assessment  - Modify environment to reduce risk of injury  - Provide assistive devices as appropriate  - Consider OT/PT consult to assist with strengthening/mobility  - Encourage toileting schedule  Outcome: Progressing     Problem: DISCHARGE PLANNING  Goal: Discharge to home or other facility with appropriate resources  Description: INTERVENTIONS:  - Identify barriers to discharge w/pt and caregiver  - Include patient/family/discharge partner in discharge planning  - Arrange for needed discharge resources and transportation as appropriate  - Identify discharge learning needs (meds, wound care, etc)  - Arrange for interpreters to assist at discharge as needed  - Consider post-discharge preferences of patient/family/discharge partner  - Complete POLST form as appropriate  - Assess patient's ability to be responsible for managing their own health  - Refer to Case Management Department for coordinating discharge planning if the patient needs post-hospital services based on physician/LIP order or complex needs related to functional status, cognitive ability or social support system  Outcome: Progressing
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
## 2021-09-02 NOTE — ANESTHESIA POSTPROCEDURE EVALUATION
Patient: Aysha Mills    Procedure Summary     Date: 09/02/21 Room / Location: [address] [place] [address] MAIN OR 11 / [address] [place] [address] MAIN OR    Anesthesia Start: 2599 Anesthesia Stop: 1563    Procedure: LEFT HIP PINNING (Left ) Diagnosis:       Closed fracture of left femur ([facility] [address]

## 2021-09-03 VITALS
HEART RATE: 53 BPM | SYSTOLIC BLOOD PRESSURE: 130 MMHG | TEMPERATURE: 99 F | OXYGEN SATURATION: 96 % | WEIGHT: 180.38 LBS | RESPIRATION RATE: 16 BRPM | DIASTOLIC BLOOD PRESSURE: 71 MMHG | BODY MASS INDEX: 26 KG/M2

## 2021-09-03 LAB
ALBUMIN SERPL-MCNC: 3.3 G/DL (ref 3.4–5)
ANION GAP SERPL CALC-SCNC: 3 MMOL/L (ref 0–18)
BASOPHILS # BLD AUTO: 0.01 X10(3) UL (ref 0–0.2)
BASOPHILS NFR BLD AUTO: 0.1 %
BUN BLD-MCNC: 11 MG/DL (ref 7–18)
BUN/CREAT SERPL: 12.2 (ref 10–20)
CALCIUM BLD-MCNC: 8.9 MG/DL (ref 8.5–10.1)
CHLORIDE SERPL-SCNC: 109 MMOL/L (ref 98–112)
CO2 SERPL-SCNC: 29 MMOL/L (ref 21–32)
CREAT BLD-MCNC: 0.9 MG/DL
DEPRECATED RDW RBC AUTO: 44.7 FL (ref 35.1–46.3)
EOSINOPHIL NFR BLD AUTO: 0 %
ERYTHROCYTE [DISTWIDTH] IN BLOOD BY AUTOMATED COUNT: 12.6 % (ref 11–15)
GLUCOSE BLD-MCNC: 145 MG/DL (ref 70–99)
HAV IGM SER QL: 2.1 MG/DL (ref 1.6–2.6)
HCT VFR BLD AUTO: 37.2 %
HGB BLD-MCNC: 11.7 G/DL
IMM GRANULOCYTES # BLD AUTO: 0.02 X10(3) UL (ref 0–1)
IMM GRANULOCYTES NFR BLD: 0.3 %
LYMPHOCYTES # BLD AUTO: 0.39 X10(3) UL (ref 1–4)
LYMPHOCYTES NFR BLD AUTO: 5.3 %
MCH RBC QN AUTO: 30.4 PG (ref 26–34)
MCHC RBC AUTO-ENTMCNC: 31.5 G/DL (ref 31–37)
MCV RBC AUTO: 96.6 FL
MONOCYTES # BLD AUTO: 0.37 X10(3) UL (ref 0.1–1)
MONOCYTES NFR BLD AUTO: 5 %
NEUTROPHILS # BLD AUTO: 6.63 X10 (3) UL (ref 1.5–7.7)
NEUTROPHILS # BLD AUTO: 6.63 X10(3) UL (ref 1.5–7.7)
NEUTROPHILS NFR BLD AUTO: 89.3 %
OSMOLALITY SERPL CALC.SUM OF ELEC: 294 MOSM/KG (ref 275–295)
PHOSPHATE SERPL-MCNC: 3.8 MG/DL (ref 2.5–4.9)
PLATELET # BLD AUTO: 136 10(3)UL (ref 150–450)
POTASSIUM SERPL-SCNC: 4.3 MMOL/L (ref 3.5–5.1)
RBC # BLD AUTO: 3.85 X10(6)UL
SODIUM SERPL-SCNC: 141 MMOL/L (ref 136–145)
WBC # BLD AUTO: 7.4 X10(3) UL (ref 4–11)

## 2021-09-03 PROCEDURE — 99239 HOSP IP/OBS DSCHRG MGMT >30: CPT | Performed by: HOSPITALIST

## 2021-09-03 RX ORDER — HYDROCODONE BITARTRATE AND ACETAMINOPHEN 5; 325 MG/1; MG/1
1 TABLET ORAL EVERY 4 HOURS PRN
Qty: 30 TABLET | Refills: 0 | Status: SHIPPED | OUTPATIENT
Start: 2021-09-03 | End: 2021-10-19 | Stop reason: ALTCHOICE

## 2021-09-03 NOTE — CM/SW NOTE
09/03/21 1500   CM/SW Referral Data   Referral Source Physician   Reason for Referral Discharge planning   Informant Patient   Patient Info   Patient's Current Mental Status at Time of Assessment Alert;Oriented   Patient's [address]   Number of Levels in Home 1   Number of Stair in Home 1   Patient lives with Parent(s)   Patient Status Prior to Admission   Independent with ADLs and Mobility Yes   Services in place prior to admission DME/Supplies at home   Type of DME/Supplies Nasir [place]; [address]   Discharge Needs   Anticipated D/C needs Home health care   Choice of Post-Acute Provider   Informed patient of right to choose their preferred provider Yes   List of appropriate post-acute services provided to patient/family with quality data Yes   Patient/family choice [facility]     SW discussed DC plan with pt. Pt agreeable to have Bettie Gross. [facility] accepted, pt agreeable after hearing choices. Orders/F2F entered     PLAN: Home with [facility], LSW, MSW ext.  [phone]

## 2021-09-03 NOTE — PLAN OF CARE
Pt A+Ox4, VSS, saturating well on room air, pain controlled with PRN Norco. Up with x1 assist and walker, walker offered however pt stated he has one at home. Surgical dressing with scant old drainage, per Dr. Porsha Montalvo dressing to be changed prior to discharge. Currently up in chair, call light within reach. Plan is to discharge home with home health today.      Problem: Patient Centered Care  Goal: Patient preferences are identified and integrated in the patient's plan of care  Description: Interventions:  - What would you like us to know as we care for you?  - Provide timely, complete, and accurate information to patient/family  - Incorporate patient and family knowledge, values, beliefs, and cultural backgrounds into the planning and delivery of care  - Encourage patient/family to participate in care and decision-making at the level they choose  - Honor patient and family perspectives and choices  Outcome: Adequate for Discharge     Problem: Patient/Family Goals  Goal: Patient/Family Long Term Goal  Description: Patient's Long Term Goal    Interventions:  - See additional Care Plan goals for specific interventions  Outcome: Adequate for Discharge  Goal: Patient/Family Short Term Goal  Description: Patient's Short Term Goal    Interventions:   - See additional Care Plan goals for specific interventions  Outcome: Adequate for Discharge     Problem: PAIN - ADULT  Goal: Verbalizes/displays adequate comfort level or patient's stated pain goal  Description: INTERVENTIONS:  - Encourage pt to monitor pain and request assistance  - Assess pain using appropriate pain scale  - Administer analgesics based on type and severity of pain and evaluate response  - Implement non-pharmacological measures as appropriate and evaluate response  - Consider cultural and social influences on pain and pain management  - Manage/alleviate anxiety  - Utilize distraction and/or relaxation techniques  - Monitor for opioid side effects  - Notify MD/LIP if interventions unsuccessful or patient reports new pain  - Anticipate increased pain with activity and pre-medicate as appropriate  Outcome: Adequate for Discharge     Problem: RISK FOR INFECTION - ADULT  Goal: Absence of fever/infection during anticipated neutropenic period  Description: INTERVENTIONS  - Monitor WBC  - Administer growth factors as ordered  - Implement neutropenic guidelines  Outcome: Adequate for Discharge     Problem: SAFETY ADULT - FALL  Goal: Free from fall injury  Description: INTERVENTIONS:  - Assess pt frequently for physical needs  - Identify cognitive and physical deficits and behaviors that affect risk of falls.   - [place] fall precautions as indicated by assessment.  - Educate pt/family on patient safety including physical limitations  - Instruct pt to call for assistance with activity based on assessment  - Modify environment to reduce risk of injury  - Provide assistive devices as appropriate  - Consider OT/PT consult to assist with strengthening/mobility  - Encourage toileting schedule  Outcome: Adequate for Discharge     Problem: DISCHARGE PLANNING  Goal: Discharge to home or other facility with appropriate resources  Description: INTERVENTIONS:  - Identify barriers to discharge w/pt and caregiver  - Include patient/family/discharge partner in discharge planning  - Arrange for needed discharge resources and transportation as appropriate  - Identify discharge learning needs (meds, wound care, etc)  - Arrange for interpreters to assist at discharge as needed  - Consider post-discharge preferences of patient/family/discharge partner  - Complete POLST form as appropriate  - Assess patient's ability to be responsible for managing their own health  - Refer to Case Management Department for coordinating discharge planning if the patient needs post-hospital services based on physician/LIP order or complex needs related to functional status, cognitive ability or social support system  Outcome: Adequate for Discharge

## 2021-09-03 NOTE — PLAN OF CARE
Received to unit from PACU. Postop day 0 to 1. Weight-bearing as tolerated. Weaning O2. SpO2 at midnight 3L 94-95%. Patient has been completing IS frequently during the evening. Tolerating general diet, denies n/v. Ate turkey sandwich boxed meal and drinking fluids. Voiding with urinal.  Bilat SCDs/ASA. Pain adequately controlled with Norco-5. PT eval pending. From home alone. Problem: Patient Centered Care  Goal: Patient preferences are identified and integrated in the patient's plan of care  Description: Interventions:  - Provide timely, complete, and accurate information to patient/family  - Incorporate patient and family knowledge, values, beliefs, and cultural backgrounds into the planning and delivery of care  - Encourage patient/family to participate in care and decision-making at the level they choose  - Honor patient and family perspectives and choices  Outcome: Progressing     Problem: Patient/Family Goals  Goal: Patient/Family Long Term Goal  Description: Patient's Long Term Goal: My long term goal is to be able to discharge from the hospital to home or rehab. Interventions:  - PT, OT, pain meds, non-pharmacologic pain control, education on pain meds. - See additional Care Plan goals for specific interventions  Outcome: Progressing  Goal: Patient/Family Short Term Goal  Description: Patient's Short Term Goal: My goal is to have my pain be controlled to a 5 or less. Interventions:   - PT, OT, pain meds, non-pharmacologic pain control, education on pain meds.   - See additional Care Plan goals for specific interventions  Outcome: Progressing     Problem: PAIN - ADULT  Goal: Verbalizes/displays adequate comfort level or patient's stated pain goal  Description: INTERVENTIONS:  - Encourage pt to monitor pain and request assistance  - Assess pain using appropriate pain scale  - Administer analgesics based on type and severity of pain and evaluate response  - Implement non-pharmacological measures as appropriate and evaluate response  - Consider cultural and social influences on pain and pain management  - Manage/alleviate anxiety  - Utilize distraction and/or relaxation techniques  - Monitor for opioid side effects  - Notify MD/LIP if interventions unsuccessful or patient reports new pain  - Anticipate increased pain with activity and pre-medicate as appropriate  Outcome: Progressing     Problem: RISK FOR INFECTION - ADULT  Goal: Absence of fever/infection during anticipated neutropenic period  Description: INTERVENTIONS  - Monitor WBC  - Administer growth factors as ordered  - Implement neutropenic guidelines  Outcome: Progressing     Problem: SAFETY ADULT - FALL  Goal: Free from fall injury  Description: INTERVENTIONS:  - Assess pt frequently for physical needs  - Identify cognitive and physical deficits and behaviors that affect risk of falls.   - [place] fall precautions as indicated by assessment.  - Educate pt/family on patient safety including physical limitations  - Instruct pt to call for assistance with activity based on assessment  - Modify environment to reduce risk of injury  - Provide assistive devices as appropriate  - Consider OT/PT consult to assist with strengthening/mobility  - Encourage toileting schedule  Outcome: Progressing     Problem: DISCHARGE PLANNING  Goal: Discharge to home or other facility with appropriate resources  Description: INTERVENTIONS:  - Identify barriers to discharge w/pt and caregiver  - Include patient/family/discharge partner in discharge planning  - Arrange for needed discharge resources and transportation as appropriate  - Identify discharge learning needs (meds, wound care, etc)  - Arrange for interpreters to assist at discharge as needed  - Consider post-discharge preferences of patient/family/discharge partner  - Complete POLST form as appropriate  - Assess patient's ability to be responsible for managing their own health  - Refer to Case Management Department for coordinating discharge planning if the patient needs post-hospital services based on physician/LIP order or complex needs related to functional status, cognitive ability or social support system  Outcome: Progressing

## 2021-09-13 NOTE — CM/SW NOTE
The pt. Refused all [place] [address] services.       Sidney Chinchilla, [facility] ext [phone]

## 2021-09-15 ENCOUNTER — PATIENT OUTREACH (OUTPATIENT)
Dept: CASE MANAGEMENT | Age: 66
End: 2021-09-15

## 2021-09-15 NOTE — PROGRESS NOTES
[facility] message sent to the pt for condition update regarding ortho surgery.

## 2021-09-20 ENCOUNTER — LAB ENCOUNTER (OUTPATIENT)
Dept: LAB | Facility: HOSPITAL | Age: 66
End: 2021-09-20
Attending: UROLOGY
Payer: MEDICARE

## 2021-09-20 DIAGNOSIS — C61 PROSTATE CANCER (HCC): ICD-10-CM

## 2021-09-20 LAB — PSA SERPL-MCNC: <0.01 NG/ML (ref ?–4)

## 2021-09-20 PROCEDURE — 84153 ASSAY OF PSA TOTAL: CPT

## 2021-09-20 PROCEDURE — 36415 COLL VENOUS BLD VENIPUNCTURE: CPT

## 2021-09-21 ENCOUNTER — HOSPITAL ENCOUNTER (OUTPATIENT)
Dept: GENERAL RADIOLOGY | Facility: HOSPITAL | Age: 66
Discharge: HOME | End: 2021-09-21
Attending: ORTHOPAEDIC SURGERY
Payer: MEDICARE

## 2021-09-21 ENCOUNTER — OFFICE VISIT (OUTPATIENT)
Dept: ORTHOPEDICS CLINIC | Facility: CLINIC | Age: 66
End: 2021-09-21
Payer: MEDICARE

## 2021-09-21 DIAGNOSIS — Z47.89 ORTHOPEDIC AFTERCARE: ICD-10-CM

## 2021-09-21 DIAGNOSIS — Z98.890 S/P ORIF (OPEN REDUCTION INTERNAL FIXATION) FRACTURE: Primary | ICD-10-CM

## 2021-09-21 DIAGNOSIS — Z87.81 S/P ORIF (OPEN REDUCTION INTERNAL FIXATION) FRACTURE: Primary | ICD-10-CM

## 2021-09-21 PROCEDURE — 99024 POSTOP FOLLOW-UP VISIT: CPT | Performed by: ORTHOPAEDIC SURGERY

## 2021-09-21 PROCEDURE — 1111F DSCHRG MED/CURRENT MED MERGE: CPT | Performed by: ORTHOPAEDIC SURGERY

## 2021-09-21 PROCEDURE — 73502 X-RAY EXAM HIP UNI 2-3 VIEWS: CPT | Performed by: ORTHOPAEDIC SURGERY

## 2021-09-21 NOTE — PROGRESS NOTES
NURSING INTAKE COMMENTS: Patient presents with:  Post-Op: Left femur surgery 9/1/21, 1st POV. Patient denies any pain or swelling.       HPI: This 74year old male presents today for follow-up left femoral neck fracture status post percutaneous screw fixati Relation Age of Onset   • Heart Disorder Father         CFH   • Hypertension Mother    • Lipids Mother    • Cancer Mother         Breast Cancer, 1963 approx.    • Hypertension Brother        Social History    Occupational History      Not on file    Tobacco resulting in mild valgus angulation at the fracture site. There is no comminution or extension to the articular surface. There is no associated dislocation. There is stable mild hip joint osteoarthritis.   Mild subchondral sclerosis involving the pubic s 9/01/2021, 12:23 PM.  [facility] 2 OR 3 VIEWS, LEFT (CPT=[id number]), 11/27/2019, 8:40 AM.  INDICATIONS: Left hip pain post fall today. TECHNIQUE: 3 views were obtained.    FINDINGS:  BONES: There has been develop Patient can continue low impact activities as tolerated such as household work and taking walks. Do not recommend any high impact activities or bicycling yet. He will follow-up in 4 weeks for repeat evaluation and x-rays.  Staples removed and Steri-Strips a

## 2021-09-22 ENCOUNTER — OFFICE VISIT (OUTPATIENT)
Dept: SURGERY | Facility: CLINIC | Age: 66
End: 2021-09-22
Payer: MEDICARE

## 2021-09-22 VITALS — SYSTOLIC BLOOD PRESSURE: 121 MMHG | DIASTOLIC BLOOD PRESSURE: 66 MMHG | HEART RATE: 62 BPM

## 2021-09-22 DIAGNOSIS — C61 PROSTATE CANCER (HCC): Primary | ICD-10-CM

## 2021-09-22 PROCEDURE — 99213 OFFICE O/P EST LOW 20 MIN: CPT | Performed by: UROLOGY

## 2021-09-22 NOTE — PROGRESS NOTES
[facility], [facility]  Follow-Up Visit    HPI: Lane Negron is a 74year old male presents for a follow up visit. Patient was last seen on 3/22/2021. INTERVAL HISTORY: Here for follow-up of prostate cancer. Doing well; feels 'great'.      PSA 9/20/ radiology review the lesion was deemed as almost certainly benign. No voiding symptoms pre-op. Patient not sexually active. - 12/30/2019: s/p RALP + PLND.  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   Psychiatric: He has a normal mood and affect.  His behavior is normal.       PATHOLOGY:    Surgical Pathology                        Case: [id number]  Provider: Rose Ortiz MD       Collected: 12/30/2019  Pathologist: Kierra Ford MD hemangioma/fibrous dysplasia are considered less likely. Results of this examination were discussed with the patient's physician, Dr. Danielle Martínez, by Dr. Julia Savage at 13:10 on 12/11/2019.         IMPRESSION:  74year old male with clinically localized

## 2021-09-29 NOTE — PROGRESS NOTES
Pt called back and LM to call him back. Attempted to contact pt for condition update regarding ortho surgery however no answer. Call continued to ring and did not go to a . NCM to try again at a later time.

## 2021-09-30 ENCOUNTER — IMMUNIZATION (OUTPATIENT)
Dept: LAB | Facility: HOSPITAL | Age: 66
End: 2021-09-30
Attending: EMERGENCY MEDICINE
Payer: MEDICARE

## 2021-09-30 DIAGNOSIS — Z23 NEED FOR VACCINATION: Primary | ICD-10-CM

## 2021-09-30 PROCEDURE — 0003A SARSCOV2 VAC 30MCG/0.3ML IM: CPT

## 2021-10-05 NOTE — PROGRESS NOTES
Multiple attempts to reach pt and messages left with no return call. Past 30 day timeframe. Encounter closing.

## 2021-10-11 ENCOUNTER — APPOINTMENT (OUTPATIENT)
Dept: OTHER | Facility: HOSPITAL | Age: 66
End: 2021-10-11
Attending: EMERGENCY MEDICINE

## 2021-10-19 ENCOUNTER — OFFICE VISIT (OUTPATIENT)
Dept: ORTHOPEDICS CLINIC | Facility: CLINIC | Age: 66
End: 2021-10-19
Payer: MEDICARE

## 2021-10-19 ENCOUNTER — HOSPITAL ENCOUNTER (OUTPATIENT)
Dept: GENERAL RADIOLOGY | Facility: HOSPITAL | Age: 66
Discharge: HOME | End: 2021-10-19
Attending: ORTHOPAEDIC SURGERY
Payer: MEDICARE

## 2021-10-19 DIAGNOSIS — Z47.89 ORTHOPEDIC AFTERCARE: ICD-10-CM

## 2021-10-19 DIAGNOSIS — Z98.890 S/P ORIF (OPEN REDUCTION INTERNAL FIXATION) FRACTURE: Primary | ICD-10-CM

## 2021-10-19 DIAGNOSIS — Z87.81 S/P ORIF (OPEN REDUCTION INTERNAL FIXATION) FRACTURE: Primary | ICD-10-CM

## 2021-10-19 PROCEDURE — 99024 POSTOP FOLLOW-UP VISIT: CPT | Performed by: ORTHOPAEDIC SURGERY

## 2021-10-19 PROCEDURE — 73502 X-RAY EXAM HIP UNI 2-3 VIEWS: CPT | Performed by: ORTHOPAEDIC SURGERY

## 2021-10-19 NOTE — PROGRESS NOTES
NURSING INTAKE COMMENTS: Patient presents with:  Post-Op: S/p ORIF L sx, 9/1/21. Patient denies any pain, numbness or tingling. Overall is well.         HPI: This 74year old male presents today for routine follow-up 6 weeks status post left femoral neck fr Vaping Use: Never used    Substance and Sexual Activity      Alcohol use: Not Currently      Drug use: No      Sexual activity: Not on file       Review of Systems:  GENERAL: feels generally well, no recent fevers or chills, no significant weight loss or Component Value Date    WBC 7.4 09/03/2021    HGB 11.7 (L) 09/03/2021    .0 (L) 09/03/2021      Lab Results   Component Value Date     (H) 09/03/2021    BUN 11 09/03/2021    CREATSERUM 0.90 09/03/2021    GFRNAA 87 09/03/2021    GFRAA 101 09

## 2021-12-01 ENCOUNTER — HOSPITAL ENCOUNTER (OUTPATIENT)
Dept: CT IMAGING | Facility: HOSPITAL | Age: 66
Discharge: HOME | End: 2021-12-01
Attending: FAMILY MEDICINE
Payer: MEDICARE

## 2021-12-01 DIAGNOSIS — R91.1 LUNG NODULE, SOLITARY: ICD-10-CM

## 2021-12-01 PROCEDURE — 71250 CT THORAX DX C-: CPT | Performed by: FAMILY MEDICINE

## 2021-12-12 DIAGNOSIS — L65.9 ALOPECIA: ICD-10-CM

## 2021-12-13 RX ORDER — FINASTERIDE 1 MG/1
1 TABLET, FILM COATED ORAL DAILY
Qty: 90 TABLET | Refills: 0 | Status: SHIPPED | OUTPATIENT
Start: 2021-12-13 | End: 2021-12-16

## 2021-12-16 ENCOUNTER — OFFICE VISIT (OUTPATIENT)
Dept: FAMILY MEDICINE CLINIC | Facility: CLINIC | Age: 66
End: 2021-12-16
Payer: MEDICARE

## 2021-12-16 VITALS
WEIGHT: 176 LBS | TEMPERATURE: 98 F | BODY MASS INDEX: 25.2 KG/M2 | SYSTOLIC BLOOD PRESSURE: 118 MMHG | DIASTOLIC BLOOD PRESSURE: 80 MMHG | HEIGHT: 70 IN | HEART RATE: 60 BPM | OXYGEN SATURATION: 96 %

## 2021-12-16 DIAGNOSIS — E78.00 HYPERCHOLESTEREMIA: Primary | ICD-10-CM

## 2021-12-16 DIAGNOSIS — L65.9 ALOPECIA: ICD-10-CM

## 2021-12-16 DIAGNOSIS — I10 ESSENTIAL HYPERTENSION: ICD-10-CM

## 2021-12-16 PROCEDURE — 99214 OFFICE O/P EST MOD 30 MIN: CPT | Performed by: FAMILY MEDICINE

## 2021-12-16 RX ORDER — LISINOPRIL 20 MG/1
20 TABLET ORAL DAILY
Qty: 90 TABLET | Refills: 1 | Status: SHIPPED | OUTPATIENT
Start: 2021-12-16

## 2021-12-16 RX ORDER — FINASTERIDE 1 MG/1
1 TABLET, FILM COATED ORAL DAILY
Qty: 90 TABLET | Refills: 1 | Status: SHIPPED | OUTPATIENT
Start: 2021-12-16

## 2021-12-19 NOTE — PROGRESS NOTES
HPI:   Patient presents with:  Blood Pressure      Martin Bearden is a 74year old male presenting for:    HTN/HLD  -Taking meds as prescribed-tolerating well without SEs.   -Denies CP, Palpitations, leg edema, SOB, LOC, HA, syncope  -Home BP readings 1987 approx.    • COLONOSCOPY  06/24/2020   • LAPAROSCOPY, SURGICAL PROSTATECTOMY, RETROPUBIC RADICAL, W/NERVE SPARING  12/30/2019    RALP + PLND by Dr. Surjit Pina @ [facility] (pT3a N0)     No Known Allergies   Social History:  Social History    Tobacco Use      S normal. No respiratory distress. Abdominal: Soft. Bowel sounds are normal. He exhibits no distension. There is no abdominal tenderness. Neurological: No cranial nerve deficit.            ASSESSMENT AND PLAN:   Patient is a 74year old male who presents Maintenance:  Zoster Vaccines(2 of 3) due on 05/10/2017  Annual Depression Screen due on 12/30/2020  Fall Risk Screening due on 06/09/2021  Annual Physical due on 06/16/2021  PSA due on 09/01/2022  Colonoscopy due on 06/24/2025  Influenza Vaccine Completed

## 2022-03-23 ENCOUNTER — LAB ENCOUNTER (OUTPATIENT)
Dept: LAB | Facility: HOSPITAL | Age: 67
End: 2022-03-23
Attending: UROLOGY
Payer: MEDICARE

## 2022-03-23 ENCOUNTER — OFFICE VISIT (OUTPATIENT)
Dept: SURGERY | Facility: CLINIC | Age: 67
End: 2022-03-23
Payer: MEDICARE

## 2022-03-23 DIAGNOSIS — C61 PROSTATE CANCER (HCC): ICD-10-CM

## 2022-03-23 DIAGNOSIS — C61 PROSTATE CANCER (HCC): Primary | ICD-10-CM

## 2022-03-23 LAB — PSA SERPL-MCNC: <0.01 NG/ML (ref ?–4)

## 2022-03-23 PROCEDURE — 99213 OFFICE O/P EST LOW 20 MIN: CPT | Performed by: UROLOGY

## 2022-03-23 PROCEDURE — 84153 ASSAY OF PSA TOTAL: CPT

## 2022-03-23 PROCEDURE — 36415 COLL VENOUS BLD VENIPUNCTURE: CPT

## 2022-06-12 DIAGNOSIS — I10 ESSENTIAL HYPERTENSION: ICD-10-CM

## 2022-06-13 RX ORDER — LISINOPRIL 20 MG/1
TABLET ORAL
Qty: 90 TABLET | Refills: 0 | Status: SHIPPED | OUTPATIENT
Start: 2022-06-13

## 2022-06-18 ENCOUNTER — PATIENT MESSAGE (OUTPATIENT)
Dept: INTERNAL MEDICINE CLINIC | Facility: CLINIC | Age: 67
End: 2022-06-18

## 2022-06-20 ENCOUNTER — LAB ENCOUNTER (OUTPATIENT)
Dept: LAB | Facility: HOSPITAL | Age: 67
End: 2022-06-20
Attending: FAMILY MEDICINE
Payer: MEDICARE

## 2022-06-20 ENCOUNTER — OFFICE VISIT (OUTPATIENT)
Dept: INTERNAL MEDICINE CLINIC | Facility: CLINIC | Age: 67
End: 2022-06-20
Payer: MEDICARE

## 2022-06-20 VITALS
OXYGEN SATURATION: 97 % | HEIGHT: 70 IN | TEMPERATURE: 97 F | HEART RATE: 72 BPM | SYSTOLIC BLOOD PRESSURE: 122 MMHG | WEIGHT: 182 LBS | DIASTOLIC BLOOD PRESSURE: 84 MMHG | BODY MASS INDEX: 26.05 KG/M2

## 2022-06-20 DIAGNOSIS — L81.9 ATYPICAL PIGMENTED SKIN LESION: ICD-10-CM

## 2022-06-20 DIAGNOSIS — S72.002A CLOSED FRACTURE OF LEFT HIP, INITIAL ENCOUNTER (HCC): ICD-10-CM

## 2022-06-20 DIAGNOSIS — Z00.00 ENCOUNTER FOR ANNUAL HEALTH EXAMINATION: Primary | ICD-10-CM

## 2022-06-20 DIAGNOSIS — D64.9 MILD CHRONIC ANEMIA: ICD-10-CM

## 2022-06-20 DIAGNOSIS — Z85.46 HISTORY OF PROSTATE CANCER: ICD-10-CM

## 2022-06-20 DIAGNOSIS — H91.90 HEARING LOSS, UNSPECIFIED HEARING LOSS TYPE, UNSPECIFIED LATERALITY: ICD-10-CM

## 2022-06-20 DIAGNOSIS — E66.3 OVERWEIGHT (BMI 25.0-29.9): ICD-10-CM

## 2022-06-20 DIAGNOSIS — D69.6 THROMBOCYTOPENIA (HCC): ICD-10-CM

## 2022-06-20 DIAGNOSIS — E78.00 HYPERCHOLESTEREMIA: ICD-10-CM

## 2022-06-20 DIAGNOSIS — I10 ESSENTIAL HYPERTENSION: ICD-10-CM

## 2022-06-20 LAB
ALBUMIN SERPL-MCNC: 3.9 G/DL (ref 3.4–5)
ALBUMIN/GLOB SERPL: 1.3 {RATIO} (ref 1–2)
ALP LIVER SERPL-CCNC: 60 U/L
ALT SERPL-CCNC: 24 U/L
ANION GAP SERPL CALC-SCNC: 7 MMOL/L (ref 0–18)
AST SERPL-CCNC: 15 U/L (ref 15–37)
BASOPHILS # BLD AUTO: 0.03 X10(3) UL (ref 0–0.2)
BASOPHILS NFR BLD AUTO: 0.6 %
BILIRUB SERPL-MCNC: 0.5 MG/DL (ref 0.1–2)
BUN BLD-MCNC: 15 MG/DL (ref 7–18)
BUN/CREAT SERPL: 16.7 (ref 10–20)
CALCIUM BLD-MCNC: 8.9 MG/DL (ref 8.5–10.1)
CHLORIDE SERPL-SCNC: 111 MMOL/L (ref 98–112)
CHOLEST SERPL-MCNC: 127 MG/DL (ref ?–200)
CO2 SERPL-SCNC: 24 MMOL/L (ref 21–32)
CREAT BLD-MCNC: 0.9 MG/DL
DEPRECATED HBV CORE AB SER IA-ACNC: 159.4 NG/ML
DEPRECATED RDW RBC AUTO: 45.8 FL (ref 35.1–46.3)
EOSINOPHIL # BLD AUTO: 0.04 X10(3) UL (ref 0–0.7)
EOSINOPHIL NFR BLD AUTO: 0.7 %
ERYTHROCYTE [DISTWIDTH] IN BLOOD BY AUTOMATED COUNT: 12.5 % (ref 11–15)
FASTING PATIENT LIPID ANSWER: YES
FASTING STATUS PATIENT QL REPORTED: YES
GLOBULIN PLAS-MCNC: 3.1 G/DL (ref 2.8–4.4)
GLUCOSE BLD-MCNC: 78 MG/DL (ref 70–99)
HCT VFR BLD AUTO: 37.9 %
HDLC SERPL-MCNC: 66 MG/DL (ref 40–59)
HGB BLD-MCNC: 12 G/DL
IMM GRANULOCYTES # BLD AUTO: 0.04 X10(3) UL (ref 0–1)
IMM GRANULOCYTES NFR BLD: 0.7 %
IRON SATN MFR SERPL: 23 %
IRON SERPL-MCNC: 80 UG/DL
LDLC SERPL CALC-MCNC: 50 MG/DL (ref ?–100)
LYMPHOCYTES # BLD AUTO: 1.1 X10(3) UL (ref 1–4)
LYMPHOCYTES NFR BLD AUTO: 20.3 %
MCH RBC QN AUTO: 31.6 PG (ref 26–34)
MCHC RBC AUTO-ENTMCNC: 31.7 G/DL (ref 31–37)
MCV RBC AUTO: 99.7 FL
MONOCYTES # BLD AUTO: 0.37 X10(3) UL (ref 0.1–1)
MONOCYTES NFR BLD AUTO: 6.8 %
NEUTROPHILS # BLD AUTO: 3.85 X10 (3) UL (ref 1.5–7.7)
NEUTROPHILS # BLD AUTO: 3.85 X10(3) UL (ref 1.5–7.7)
NEUTROPHILS NFR BLD AUTO: 70.9 %
NONHDLC SERPL-MCNC: 61 MG/DL (ref ?–130)
OSMOLALITY SERPL CALC.SUM OF ELEC: 294 MOSM/KG (ref 275–295)
PLATELET # BLD AUTO: 174 10(3)UL (ref 150–450)
POTASSIUM SERPL-SCNC: 4.1 MMOL/L (ref 3.5–5.1)
PROT SERPL-MCNC: 7 G/DL (ref 6.4–8.2)
RBC # BLD AUTO: 3.8 X10(6)UL
SODIUM SERPL-SCNC: 142 MMOL/L (ref 136–145)
TIBC SERPL-MCNC: 343 UG/DL (ref 240–450)
TRANSFERRIN SERPL-MCNC: 230 MG/DL (ref 200–360)
TRIGL SERPL-MCNC: 46 MG/DL (ref 30–149)
VLDLC SERPL CALC-MCNC: 7 MG/DL (ref 0–30)
WBC # BLD AUTO: 5.4 X10(3) UL (ref 4–11)

## 2022-06-20 PROCEDURE — 80053 COMPREHEN METABOLIC PANEL: CPT

## 2022-06-20 PROCEDURE — 82728 ASSAY OF FERRITIN: CPT

## 2022-06-20 PROCEDURE — 85025 COMPLETE CBC W/AUTO DIFF WBC: CPT | Performed by: FAMILY MEDICINE

## 2022-06-20 PROCEDURE — 36415 COLL VENOUS BLD VENIPUNCTURE: CPT | Performed by: FAMILY MEDICINE

## 2022-06-20 PROCEDURE — 83540 ASSAY OF IRON: CPT

## 2022-06-20 PROCEDURE — 80061 LIPID PANEL: CPT

## 2022-06-20 PROCEDURE — 84466 ASSAY OF TRANSFERRIN: CPT

## 2022-06-20 RX ORDER — SIMVASTATIN 20 MG
20 TABLET ORAL NIGHTLY
Qty: 90 TABLET | Refills: 3 | Status: SHIPPED | OUTPATIENT
Start: 2022-06-20

## 2022-06-20 RX ORDER — LISINOPRIL 10 MG/1
10 TABLET ORAL DAILY
Qty: 90 TABLET | Refills: 1 | Status: SHIPPED | OUTPATIENT
Start: 2022-06-20

## 2022-09-09 DIAGNOSIS — L65.9 ALOPECIA: ICD-10-CM

## 2022-09-13 RX ORDER — FINASTERIDE 1 MG/1
TABLET, FILM COATED ORAL
Qty: 90 TABLET | Refills: 0 | Status: SHIPPED | OUTPATIENT
Start: 2022-09-13

## 2022-09-22 ENCOUNTER — LAB ENCOUNTER (OUTPATIENT)
Dept: LAB | Facility: HOSPITAL | Age: 67
End: 2022-09-22
Attending: UROLOGY

## 2022-09-22 DIAGNOSIS — C61 PROSTATE CANCER (HCC): ICD-10-CM

## 2022-09-22 LAB — PSA SERPL-MCNC: <0.01 NG/ML (ref ?–4)

## 2022-09-22 PROCEDURE — 84153 ASSAY OF PSA TOTAL: CPT

## 2022-09-22 PROCEDURE — 36415 COLL VENOUS BLD VENIPUNCTURE: CPT

## 2022-09-28 ENCOUNTER — OFFICE VISIT (OUTPATIENT)
Dept: SURGERY | Facility: CLINIC | Age: 67
End: 2022-09-28

## 2022-09-28 DIAGNOSIS — C61 PROSTATE CANCER (HCC): Primary | ICD-10-CM

## 2022-09-28 PROCEDURE — 99213 OFFICE O/P EST LOW 20 MIN: CPT | Performed by: UROLOGY

## 2022-09-28 PROCEDURE — 1126F AMNT PAIN NOTED NONE PRSNT: CPT | Performed by: UROLOGY

## 2022-10-04 ENCOUNTER — APPOINTMENT (OUTPATIENT)
Dept: OTHER | Facility: HOSPITAL | Age: 67
End: 2022-10-04
Attending: PREVENTIVE MEDICINE

## 2022-12-09 DIAGNOSIS — I10 ESSENTIAL HYPERTENSION: ICD-10-CM

## 2022-12-09 RX ORDER — LISINOPRIL 10 MG/1
10 TABLET ORAL DAILY
Qty: 90 TABLET | Refills: 0 | Status: SHIPPED | OUTPATIENT
Start: 2022-12-09

## 2022-12-14 DIAGNOSIS — L65.9 ALOPECIA: ICD-10-CM

## 2022-12-14 RX ORDER — FINASTERIDE 1 MG/1
1 TABLET, FILM COATED ORAL DAILY
Qty: 90 TABLET | Refills: 0 | Status: SHIPPED | OUTPATIENT
Start: 2022-12-14

## 2023-02-10 ENCOUNTER — TELEPHONE (OUTPATIENT)
Dept: INTERNAL MEDICINE CLINIC | Facility: CLINIC | Age: 68
End: 2023-02-10

## 2023-02-10 DIAGNOSIS — E66.3 SEVERELY OVERWEIGHT: ICD-10-CM

## 2023-02-10 DIAGNOSIS — I10 ESSENTIAL HYPERTENSION, MALIGNANT: ICD-10-CM

## 2023-02-10 DIAGNOSIS — Z85.46 PERSONAL HISTORY OF MALIGNANT NEOPLASM OF PROSTATE: Primary | ICD-10-CM

## 2023-02-10 DIAGNOSIS — Z00.00 PE (PHYSICAL EXAM), ANNUAL: ICD-10-CM

## 2023-02-10 DIAGNOSIS — E78.00 PURE HYPERCHOLESTEROLEMIA: ICD-10-CM

## 2023-02-10 NOTE — TELEPHONE ENCOUNTER
Pt called stating that he was at lab, that doctor usually sends him orders for bw and now pt has an appt Monday but there is no lab orders  To please place orders and inform pt

## 2023-02-13 ENCOUNTER — LAB ENCOUNTER (OUTPATIENT)
Dept: LAB | Facility: REFERENCE LAB | Age: 68
End: 2023-02-13
Attending: FAMILY MEDICINE
Payer: MEDICARE

## 2023-02-13 DIAGNOSIS — Z00.00 PE (PHYSICAL EXAM), ANNUAL: ICD-10-CM

## 2023-02-13 DIAGNOSIS — E66.3 SEVERELY OVERWEIGHT: ICD-10-CM

## 2023-02-13 DIAGNOSIS — E78.00 PURE HYPERCHOLESTEROLEMIA: ICD-10-CM

## 2023-02-13 DIAGNOSIS — Z85.46 PERSONAL HISTORY OF MALIGNANT NEOPLASM OF PROSTATE: ICD-10-CM

## 2023-02-13 DIAGNOSIS — I10 ESSENTIAL HYPERTENSION, MALIGNANT: ICD-10-CM

## 2023-02-13 LAB
ALBUMIN SERPL-MCNC: 3.8 G/DL (ref 3.4–5)
ALBUMIN/GLOB SERPL: 1.1 {RATIO} (ref 1–2)
ALP LIVER SERPL-CCNC: 58 U/L
ALT SERPL-CCNC: 24 U/L
ANION GAP SERPL CALC-SCNC: 4 MMOL/L (ref 0–18)
AST SERPL-CCNC: 12 U/L (ref 15–37)
BASOPHILS # BLD AUTO: 0.03 X10(3) UL (ref 0–0.2)
BASOPHILS NFR BLD AUTO: 0.5 %
BILIRUB SERPL-MCNC: 0.7 MG/DL (ref 0.1–2)
BUN BLD-MCNC: 19 MG/DL (ref 7–18)
BUN/CREAT SERPL: 19.8 (ref 10–20)
CALCIUM BLD-MCNC: 9.3 MG/DL (ref 8.5–10.1)
CHLORIDE SERPL-SCNC: 110 MMOL/L (ref 98–112)
CHOLEST SERPL-MCNC: 119 MG/DL (ref ?–200)
CO2 SERPL-SCNC: 28 MMOL/L (ref 21–32)
CREAT BLD-MCNC: 0.96 MG/DL
DEPRECATED RDW RBC AUTO: 41.9 FL (ref 35.1–46.3)
EOSINOPHIL # BLD AUTO: 0.1 X10(3) UL (ref 0–0.7)
EOSINOPHIL NFR BLD AUTO: 1.6 %
ERYTHROCYTE [DISTWIDTH] IN BLOOD BY AUTOMATED COUNT: 11.9 % (ref 11–15)
FASTING PATIENT LIPID ANSWER: YES
FASTING STATUS PATIENT QL REPORTED: YES
GFR SERPLBLD BASED ON 1.73 SQ M-ARVRAT: 86 ML/MIN/1.73M2 (ref 60–?)
GLOBULIN PLAS-MCNC: 3.4 G/DL (ref 2.8–4.4)
GLUCOSE BLD-MCNC: 99 MG/DL (ref 70–99)
HCT VFR BLD AUTO: 37.7 %
HDLC SERPL-MCNC: 60 MG/DL (ref 40–59)
HGB BLD-MCNC: 12.5 G/DL
IMM GRANULOCYTES # BLD AUTO: 0.02 X10(3) UL (ref 0–1)
IMM GRANULOCYTES NFR BLD: 0.3 %
LDLC SERPL CALC-MCNC: 44 MG/DL (ref ?–100)
LYMPHOCYTES # BLD AUTO: 1.34 X10(3) UL (ref 1–4)
LYMPHOCYTES NFR BLD AUTO: 22 %
MCH RBC QN AUTO: 31.6 PG (ref 26–34)
MCHC RBC AUTO-ENTMCNC: 33.2 G/DL (ref 31–37)
MCV RBC AUTO: 95.4 FL
MONOCYTES # BLD AUTO: 0.41 X10(3) UL (ref 0.1–1)
MONOCYTES NFR BLD AUTO: 6.7 %
NEUTROPHILS # BLD AUTO: 4.19 X10 (3) UL (ref 1.5–7.7)
NEUTROPHILS # BLD AUTO: 4.19 X10(3) UL (ref 1.5–7.7)
NEUTROPHILS NFR BLD AUTO: 68.9 %
NONHDLC SERPL-MCNC: 59 MG/DL (ref ?–130)
OSMOLALITY SERPL CALC.SUM OF ELEC: 296 MOSM/KG (ref 275–295)
PLATELET # BLD AUTO: 171 10(3)UL (ref 150–450)
POTASSIUM SERPL-SCNC: 4.9 MMOL/L (ref 3.5–5.1)
PROT SERPL-MCNC: 7.2 G/DL (ref 6.4–8.2)
RBC # BLD AUTO: 3.95 X10(6)UL
SODIUM SERPL-SCNC: 142 MMOL/L (ref 136–145)
TRIGL SERPL-MCNC: 71 MG/DL (ref 30–149)
VLDLC SERPL CALC-MCNC: 10 MG/DL (ref 0–30)
WBC # BLD AUTO: 6.1 X10(3) UL (ref 4–11)

## 2023-02-13 PROCEDURE — 80053 COMPREHEN METABOLIC PANEL: CPT

## 2023-02-13 PROCEDURE — 36415 COLL VENOUS BLD VENIPUNCTURE: CPT

## 2023-02-13 PROCEDURE — 85025 COMPLETE CBC W/AUTO DIFF WBC: CPT

## 2023-02-13 PROCEDURE — 80061 LIPID PANEL: CPT

## 2023-02-13 NOTE — TELEPHONE ENCOUNTER
Lab Orders placed for annual Medicare Wellness Visit as requested by patient. [employer] message sent to patient to advise him of orders.

## 2023-02-14 ENCOUNTER — OFFICE VISIT (OUTPATIENT)
Dept: INTERNAL MEDICINE CLINIC | Facility: CLINIC | Age: 68
End: 2023-02-14
Payer: MEDICARE

## 2023-02-14 VITALS
HEART RATE: 88 BPM | HEIGHT: 70 IN | SYSTOLIC BLOOD PRESSURE: 114 MMHG | OXYGEN SATURATION: 94 % | DIASTOLIC BLOOD PRESSURE: 78 MMHG | BODY MASS INDEX: 26.2 KG/M2 | TEMPERATURE: 98 F | WEIGHT: 183 LBS

## 2023-02-14 DIAGNOSIS — R91.1 PULMONARY NODULE: Primary | ICD-10-CM

## 2023-02-14 DIAGNOSIS — I10 ESSENTIAL HYPERTENSION: ICD-10-CM

## 2023-02-14 DIAGNOSIS — E78.00 HYPERCHOLESTEREMIA: ICD-10-CM

## 2023-02-14 DIAGNOSIS — L65.9 ALOPECIA: ICD-10-CM

## 2023-02-14 PROCEDURE — 99214 OFFICE O/P EST MOD 30 MIN: CPT | Performed by: FAMILY MEDICINE

## 2023-02-14 RX ORDER — LISINOPRIL 10 MG/1
10 TABLET ORAL DAILY
Qty: 90 TABLET | Refills: 1 | Status: SHIPPED | OUTPATIENT
Start: 2023-02-14

## 2023-02-14 RX ORDER — FINASTERIDE 1 MG/1
1 TABLET, FILM COATED ORAL DAILY
Qty: 90 TABLET | Refills: 1 | Status: SHIPPED | OUTPATIENT
Start: 2023-02-14

## 2023-02-27 ENCOUNTER — HOSPITAL ENCOUNTER (OUTPATIENT)
Dept: CT IMAGING | Age: 68
Discharge: HOME | End: 2023-02-27
Attending: FAMILY MEDICINE
Payer: MEDICARE

## 2023-02-27 DIAGNOSIS — R91.1 PULMONARY NODULE: ICD-10-CM

## 2023-02-27 PROCEDURE — 71250 CT THORAX DX C-: CPT | Performed by: FAMILY MEDICINE

## 2023-03-28 ENCOUNTER — LAB ENCOUNTER (OUTPATIENT)
Dept: LAB | Facility: HOSPITAL | Age: 68
End: 2023-03-28
Attending: UROLOGY
Payer: MEDICARE

## 2023-03-28 DIAGNOSIS — C61 PROSTATE CANCER (HCC): ICD-10-CM

## 2023-03-28 LAB — PSA SERPL-MCNC: <0.01 NG/ML (ref ?–4)

## 2023-03-28 PROCEDURE — 84153 ASSAY OF PSA TOTAL: CPT

## 2023-03-28 PROCEDURE — 36415 COLL VENOUS BLD VENIPUNCTURE: CPT

## 2023-03-29 ENCOUNTER — OFFICE VISIT (OUTPATIENT)
Dept: SURGERY | Facility: CLINIC | Age: 68
End: 2023-03-29

## 2023-03-29 DIAGNOSIS — C61 PROSTATE CANCER (HCC): Primary | ICD-10-CM

## 2023-03-29 PROCEDURE — 99213 OFFICE O/P EST LOW 20 MIN: CPT | Performed by: UROLOGY

## 2023-06-05 DIAGNOSIS — E78.00 HYPERCHOLESTEREMIA: ICD-10-CM

## 2023-06-05 RX ORDER — SIMVASTATIN 20 MG
20 TABLET ORAL NIGHTLY
Qty: 90 TABLET | Refills: 0 | Status: SHIPPED | OUTPATIENT
Start: 2023-06-05

## 2023-08-14 ENCOUNTER — TELEPHONE (OUTPATIENT)
Dept: INTERNAL MEDICINE CLINIC | Facility: CLINIC | Age: 68
End: 2023-08-14

## 2023-08-14 NOTE — TELEPHONE ENCOUNTER
Pt called again asking about the lab orders and wondering if he should cancel/reschedule his upcoming appt should he need to get blood work done

## 2023-08-14 NOTE — TELEPHONE ENCOUNTER
Last set of bloodwork in 2/2023 was stable. No need for labs. During OV we will do fingerstick to monitor hemoglobin.

## 2023-08-14 NOTE — TELEPHONE ENCOUNTER
Patient is calling asking if he needs to get blood work done before his visit for tomorrow. He has an appointment scheduled for a medicare physical tomorrow at 10am.       Please advice if he does need labs if blood work can be placed.

## 2023-08-15 ENCOUNTER — OFFICE VISIT (OUTPATIENT)
Dept: INTERNAL MEDICINE CLINIC | Facility: CLINIC | Age: 68
End: 2023-08-15
Payer: MEDICARE

## 2023-08-15 ENCOUNTER — LAB ENCOUNTER (OUTPATIENT)
Dept: LAB | Facility: HOSPITAL | Age: 68
End: 2023-08-15
Attending: UROLOGY
Payer: MEDICARE

## 2023-08-15 VITALS
BODY MASS INDEX: 24.34 KG/M2 | SYSTOLIC BLOOD PRESSURE: 118 MMHG | TEMPERATURE: 98 F | DIASTOLIC BLOOD PRESSURE: 70 MMHG | OXYGEN SATURATION: 96 % | WEIGHT: 170 LBS | HEART RATE: 73 BPM | HEIGHT: 70 IN

## 2023-08-15 DIAGNOSIS — Z12.83 SKIN EXAM, SCREENING FOR CANCER: ICD-10-CM

## 2023-08-15 DIAGNOSIS — Z85.46 HISTORY OF PROSTATE CANCER: ICD-10-CM

## 2023-08-15 DIAGNOSIS — I10 ESSENTIAL HYPERTENSION: ICD-10-CM

## 2023-08-15 DIAGNOSIS — E78.00 HYPERCHOLESTEREMIA: ICD-10-CM

## 2023-08-15 DIAGNOSIS — L81.9 ATYPICAL PIGMENTED SKIN LESION: ICD-10-CM

## 2023-08-15 DIAGNOSIS — D64.9 MILD CHRONIC ANEMIA: ICD-10-CM

## 2023-08-15 DIAGNOSIS — L65.9 ALOPECIA: ICD-10-CM

## 2023-08-15 DIAGNOSIS — H91.90 HEARING LOSS, UNSPECIFIED HEARING LOSS TYPE, UNSPECIFIED LATERALITY: ICD-10-CM

## 2023-08-15 DIAGNOSIS — Z00.00 ENCOUNTER FOR ANNUAL HEALTH EXAMINATION: Primary | ICD-10-CM

## 2023-08-15 DIAGNOSIS — D69.6 THROMBOCYTOPENIA (HCC): ICD-10-CM

## 2023-08-15 DIAGNOSIS — C61 PROSTATE CANCER (HCC): ICD-10-CM

## 2023-08-15 PROBLEM — E66.3 OVERWEIGHT (BMI 25.0-29.9): Status: RESOLVED | Noted: 2019-04-04 | Resolved: 2023-08-15

## 2023-08-15 PROBLEM — S72.009A HIP FRACTURE (HCC): Status: RESOLVED | Noted: 2021-09-01 | Resolved: 2023-08-15

## 2023-08-15 LAB
BASOPHILS # BLD AUTO: 0.03 X10(3) UL (ref 0–0.2)
BASOPHILS NFR BLD AUTO: 0.6 %
CHOLEST SERPL-MCNC: 153 MG/DL (ref ?–200)
DEPRECATED RDW RBC AUTO: 46.8 FL (ref 35.1–46.3)
EOSINOPHIL # BLD AUTO: 0.05 X10(3) UL (ref 0–0.7)
EOSINOPHIL NFR BLD AUTO: 1 %
ERYTHROCYTE [DISTWIDTH] IN BLOOD BY AUTOMATED COUNT: 12.8 % (ref 11–15)
FASTING PATIENT LIPID ANSWER: YES
HCT VFR BLD AUTO: 38.3 %
HDLC SERPL-MCNC: 82 MG/DL (ref 40–59)
HGB BLD-MCNC: 12.5 G/DL
IMM GRANULOCYTES # BLD AUTO: 0.02 X10(3) UL (ref 0–1)
IMM GRANULOCYTES NFR BLD: 0.4 %
LDLC SERPL CALC-MCNC: 56 MG/DL (ref ?–100)
LYMPHOCYTES # BLD AUTO: 0.97 X10(3) UL (ref 1–4)
LYMPHOCYTES NFR BLD AUTO: 19.2 %
MCH RBC QN AUTO: 32.4 PG (ref 26–34)
MCHC RBC AUTO-ENTMCNC: 32.6 G/DL (ref 31–37)
MCV RBC AUTO: 99.2 FL
MONOCYTES # BLD AUTO: 0.3 X10(3) UL (ref 0.1–1)
MONOCYTES NFR BLD AUTO: 5.9 %
NEUTROPHILS # BLD AUTO: 3.69 X10 (3) UL (ref 1.5–7.7)
NEUTROPHILS # BLD AUTO: 3.69 X10(3) UL (ref 1.5–7.7)
NEUTROPHILS NFR BLD AUTO: 72.9 %
NONHDLC SERPL-MCNC: 71 MG/DL (ref ?–130)
PLATELET # BLD AUTO: 181 10(3)UL (ref 150–450)
PSA SERPL-MCNC: <0.01 NG/ML (ref ?–4)
RBC # BLD AUTO: 3.86 X10(6)UL
TRIGL SERPL-MCNC: 82 MG/DL (ref 30–149)
VLDLC SERPL CALC-MCNC: 12 MG/DL (ref 0–30)
WBC # BLD AUTO: 5.1 X10(3) UL (ref 4–11)

## 2023-08-15 PROCEDURE — 36415 COLL VENOUS BLD VENIPUNCTURE: CPT

## 2023-08-15 PROCEDURE — G0439 PPPS, SUBSEQ VISIT: HCPCS | Performed by: FAMILY MEDICINE

## 2023-08-15 PROCEDURE — 85025 COMPLETE CBC W/AUTO DIFF WBC: CPT | Performed by: FAMILY MEDICINE

## 2023-08-15 PROCEDURE — 1126F AMNT PAIN NOTED NONE PRSNT: CPT | Performed by: FAMILY MEDICINE

## 2023-08-15 PROCEDURE — 80061 LIPID PANEL: CPT

## 2023-08-15 PROCEDURE — 84153 ASSAY OF PSA TOTAL: CPT

## 2023-08-15 PROCEDURE — 99213 OFFICE O/P EST LOW 20 MIN: CPT | Performed by: FAMILY MEDICINE

## 2023-08-15 RX ORDER — LISINOPRIL 10 MG/1
10 TABLET ORAL DAILY
Qty: 90 TABLET | Refills: 1 | Status: SHIPPED | OUTPATIENT
Start: 2023-08-15

## 2023-08-15 RX ORDER — FINASTERIDE 1 MG/1
1 TABLET, FILM COATED ORAL DAILY
Qty: 90 TABLET | Refills: 1 | Status: SHIPPED | OUTPATIENT
Start: 2023-08-15

## 2023-08-15 RX ORDER — LISINOPRIL 10 MG/1
10 TABLET ORAL DAILY
Qty: 90 TABLET | Refills: 0 | Status: SHIPPED | OUTPATIENT
Start: 2023-08-15 | End: 2023-08-15

## 2023-08-20 PROBLEM — L65.9 ALOPECIA: Status: ACTIVE | Noted: 2023-08-20

## 2023-08-29 DIAGNOSIS — E78.00 HYPERCHOLESTEREMIA: ICD-10-CM

## 2023-08-31 RX ORDER — SIMVASTATIN 20 MG
20 TABLET ORAL NIGHTLY
Qty: 90 TABLET | Refills: 0 | OUTPATIENT
Start: 2023-08-31

## 2023-09-21 ENCOUNTER — OFFICE VISIT (OUTPATIENT)
Dept: SURGERY | Facility: CLINIC | Age: 68
End: 2023-09-21

## 2023-09-21 DIAGNOSIS — C61 PROSTATE CANCER (HCC): Primary | ICD-10-CM

## 2023-09-21 PROCEDURE — 99213 OFFICE O/P EST LOW 20 MIN: CPT | Performed by: UROLOGY

## 2023-09-21 NOTE — PROGRESS NOTES
[facility]  Follow-Up Visit    HPI: Kelvin Reinoso is a 77year old male presents for a follow up visit. Patient was last seen on 3/29/2023. INTERVAL HISTORY: Here for follow-up on prostate cancer. Doing well; feels 'great'. PSA 8/2023 remains undetectable. Reports daytime and nighttime continence. Not using any incontinence pads. He denies fevers or chills, chest pain or shortness of breath, constipation or diarrhea or gross hematuria. Reports good urinary flow, no nocturia and no no feeling of incomplete emptying. Patient is not sexually active however reports getting erections and masturbating. 1. Unfavorable Intermediate Risk Prostate Cancer (pT3a N0)  Patient denies family history of prostate cancer. Elevated screening PSA level of 5.81 ng/mL in 8/2019. Patient underwent a TRUS-guided prostate biopsy by Dr. Loomis on 9/30/2019 which revealed:  - Prostate volume on TRUS: 23 cc  - Prostate AdenoCa: GG 4+3=7 in 7/14 cores, up to 75% core involvement (right base, mid, and apex). Also revealed GG 3+4=7 prostate cancer in 1/14 cores, 10% core involvement (left apex). Patient underwent a staging multi-parametric MRI of the prostate on 11/15/2018 which revealed a PI-RADS 5 lesion throughout the right side of the peripheral zone with broad-based capsular abutment concerning for early KALEB. Also noted was an enhancing 1.5 cm lesion in the anterior column of the left acetabulum, which is highly concerning for an osseous metastasis. Nuclear medicine bone scan to further evaluate the suspicious bone lesion demonstrated no definite evidence of osseous metastatic disease. \"  Subtle asymmetry at the junction of the left acetabulum and pubic ramus is nonspecific\". CT of the pelvis failed to reveal the suspicious lesion and as per radiology review the lesion was deemed as almost certainly benign. No voiding symptoms pre-op. Patient not sexually active.      - 12/30/2019: s/p RALP + PLND. Final pathology revealed GG 4+3=7 adenoCa, , bilateral, + KALEB, +PNI, - SVI, close but negative surgical margins, 0/4 LNs. pT3a N0.     - 3/2020 F/U: PSA undetectable. Patient reports being nearly completely continent. Using 1 pad daily for safety. No longer doing Kegel exercises as he feels he doesn't need them anymore. - 2020 F/U: PSA undetectable. Complete recovery of urinary continence. Not sexually active. - 3/2021 F/U: PSA undetectable. Continent. Not sexually active. - 2021 F/U: PSA undetectable. Continent. Not sexually active yet reports having erections and being able to masturbate. - 3/2022 F/U: PSA undetectable. Continent. Not sexually active yet reports having erections and being able to masturbate.     - 2022 F/U: PSA undetectable. Continent. Not sexually active yet reports having erections and being able to masturbate. - 3/2023 F/U: PSA undetectable. Continent. Not sexually active yet reports having erections and being able to masturbate.     - 2023 F/U: PSA undetectable. Continent. Not sexually active yet reports having erections and being able to masturbate. PMH: HTN, HLD, thrombocytopenia, prostate cancer dx. 2019. PSH: RALP + PLND (2019), left hip pinning. FAMILY Hx: No reported family history of  malignancies. Father is  at the age of 80. Mother passed away at 8 years of age. SOCIAL Hx: Patient is single and has no biological children. He is not sexually active. He is a former smoker and quit in  after smoking 0.5 packs/day for 12 years. He drinks alcohol socially. He works as a . Reviewed past medical, surgical, family, and social history. Reviewed med list and allergies. REVIEW OF SYSTEMS:  Pertinent positives and negatives per HPI. A 10-point review of systems was performed is otherwise negative. EXAM:  There were no vitals taken for this visit.      Physical Exam    Constitutional: He is oriented to person, place, and time. He appears well-developed. No distress. HENT:   Head: Normocephalic. Eyes: No scleral icterus. Cardiovascular:  Normal rate. Pulmonary/Chest: Effort normal.   Abdominal: Soft. He exhibits no distension. There is no abdominal tenderness. Neurological: He is alert and oriented to person, place, and time. Skin: Skin is warm and dry. Psychiatric: He has a normal mood and affect. His behavior is normal.       PATHOLOGY:    Surgical Pathology                        Case: [id number]  Provider: Sirisha Ledezma MD       Collected: 12/30/2019  Pathologist: Annie Gauthier MD     A. Prostate, bilateral seminal vesicles, ampulla of vas; radical prostatectomy:   Prostatic adenocarcinoma, Belton score 7 (4+3), grade group 3. Tumor is seen bilaterally with predominant right side involvement. Tumor shows extraprostatic extension with multifocal perineural invasion. Seminal vesicles are free of carcinoma. Tumor is seen at a distance of <1 mm from the right and left apical margins and the right posterior inked margin. Patchy mild chronic prostatitis. Preliminary pathologic staging pT3a, N0     B. Right pelvic lymph nodes; dissection:   Two lymph nodes, negative for carcinoma (0/2). Immunohistochemical stain for cytokeratin AE1/AE3 performed on blocks B1 and B2 are negative and support the above diagnosis. C. Left pelvic lymph nodes; dissection:   Two lymph nodes, negative for carcinoma (0/2). Immunohistochemical stain for cytokeratin AE1/AE3 performed on blocks C1 and C2 are negative and support the above diagnosis. D. Periprostatic fat; dissection:   Fibroadipose tissue, negative for carcinoma.       LABS:    Component PSA Screen   Latest Ref Rng & Units <=4.00 ng/mL   8/15/2023 <0.01   3/28/2023 <0.01   9/22/2022 <0.01   3/23/2022 <0.01   9/20/2021 <0.01   3/17/2021 <0.01   9/1/2020 <0.01   2/27/2020 <0.01   8/20/2019 5.81 (H)   4/3/2019 4.46 (H) IMAGING:    CT Pelvis (12/11/2019): No suspicious osseous lesions to suggest metastases. Specifically, no radiographically visible lesion in the anterior column of the left acetabulum. Given the lack of definitively suspicious uptake on the prior bone scan, the lesion noted on the prior MRI is almost certainly benign relating to etiologies such as a cartilaginous rest; additional benign lesions such as atypical hemangioma/fibrous dysplasia are considered less likely. Results of this examination were discussed with the patient's physician, Dr. Jose Manuel Paiz, by Dr. Joleen Hopkins at 13:10 on 12/11/2019. IMPRESSION:  77year old male with clinically localized unfavorable intermediate risk prostate cancer diagnosed 9/2019. Status post robotic assisted laparoscopic radical prostatectomy and bilateral pelvic lymph node dissection 12/2019. Final pathology revealing pT3aN0 disease with KALEB and PNI; negative margins. No biochemical evidence of disease recurrence given recent PSA level from 8/2023 which continues to be undetectable. Discussed results with patient; they are reassuring so far. We also discussed prostate cancer follow-up per NCCN guidelines. His recovery following surgery has been excellent and he has completely regained urinary continence. Discussed erectile function. Patient reports being able to achieve erections and masturbate. He is not sexually active with a partner at this point and is not interested in further assistance. All questions answered. PLAN:  1. Continue prostate cancer surveillance per NCCN guidelines. RTC for follow-up in 6 months with a PSA level performed prior to appointment.       Jose Manuel Paiz MD  9/21/2023

## 2023-09-25 ENCOUNTER — APPOINTMENT (OUTPATIENT)
Dept: OTHER | Facility: HOSPITAL | Age: 68
End: 2023-09-25
Attending: EMERGENCY MEDICINE

## 2024-02-15 ENCOUNTER — OFFICE VISIT (OUTPATIENT)
Dept: INTERNAL MEDICINE CLINIC | Facility: CLINIC | Age: 69
End: 2024-02-15
Payer: MEDICARE

## 2024-02-15 VITALS
BODY MASS INDEX: 25.41 KG/M2 | SYSTOLIC BLOOD PRESSURE: 134 MMHG | TEMPERATURE: 98 F | HEIGHT: 70 IN | OXYGEN SATURATION: 96 % | WEIGHT: 177.5 LBS | HEART RATE: 62 BPM | DIASTOLIC BLOOD PRESSURE: 82 MMHG

## 2024-02-15 DIAGNOSIS — L65.9 ALOPECIA: ICD-10-CM

## 2024-02-15 DIAGNOSIS — E78.00 HYPERCHOLESTEREMIA: ICD-10-CM

## 2024-02-15 DIAGNOSIS — I10 ESSENTIAL HYPERTENSION: Primary | ICD-10-CM

## 2024-02-15 PROCEDURE — 99214 OFFICE O/P EST MOD 30 MIN: CPT | Performed by: FAMILY MEDICINE

## 2024-02-15 RX ORDER — LISINOPRIL 10 MG/1
10 TABLET ORAL DAILY
Qty: 90 TABLET | Refills: 1 | Status: SHIPPED | OUTPATIENT
Start: 2024-02-15

## 2024-02-15 RX ORDER — SIMVASTATIN 10 MG
10 TABLET ORAL NIGHTLY
Qty: 90 TABLET | Refills: 1 | Status: SHIPPED | OUTPATIENT
Start: 2024-02-15

## 2024-02-15 RX ORDER — FINASTERIDE 1 MG/1
1 TABLET, FILM COATED ORAL DAILY
Qty: 90 TABLET | Refills: 1 | Status: SHIPPED | OUTPATIENT
Start: 2024-02-15

## 2024-02-19 NOTE — PROGRESS NOTES
HPI:     Chief Complaint   Patient presents with    Blood Pressure       Erik Brewer is a 68 year old male presenting for:    HTN/HLD  -Taking meds as prescribed-tolerating well without SEs.   -Denies CP, Palpitations, leg edema, SOB, LOC, HA, syncope                      Results for orders placed or performed in visit on 08/15/23   PSA Diagnostic [E]   Result Value Ref Range    PSA <0.01 <=4.00 ng/mL   Lipid Panel   Result Value Ref Range    Cholesterol, Total 153 <200 mg/dL    HDL Cholesterol 82 (H) 40 - 59 mg/dL    Triglycerides 82 30 - 149 mg/dL    LDL Cholesterol 56 <100 mg/dL    VLDL 12 0 - 30 mg/dL    Non HDL Chol 71 <130 mg/dL    Patient Fasting for Lipid? Yes        Labs:   No results found for: \"A1C\"   Lab Results   Component Value Date/Time    CHOLEST 153 08/15/2023 11:48 AM    HDL 82 (H) 08/15/2023 11:48 AM    TRIG 82 08/15/2023 11:48 AM    LDL 56 08/15/2023 11:48 AM    NONHDLC 71 08/15/2023 11:48 AM       Lab Results   Component Value Date/Time    GLU 99 02/13/2023 10:59 AM     02/13/2023 10:59 AM    K 4.9 02/13/2023 10:59 AM     02/13/2023 10:59 AM    CO2 28.0 02/13/2023 10:59 AM    CREATSERUM 0.96 02/13/2023 10:59 AM    CA 9.3 02/13/2023 10:59 AM    ALB 3.8 02/13/2023 10:59 AM    TP 7.2 02/13/2023 10:59 AM    ALKPHO 58 02/13/2023 10:59 AM    AST 12 (L) 02/13/2023 10:59 AM    ALT 24 02/13/2023 10:59 AM    BILT 0.7 02/13/2023 10:59 AM    TSH 2.850 06/14/2020 09:58 AM          Medications:  Current Outpatient Medications   Medication Sig Dispense Refill    finasteride 1 MG Oral Tab Take 1 tablet (1 mg total) by mouth daily. 90 tablet 1    lisinopril 10 MG Oral Tab Take 1 tablet (10 mg total) by mouth daily. 90 tablet 1    simvastatin 10 MG Oral Tab Take 1 tablet (10 mg total) by mouth nightly. 90 tablet 1      Past Medical History:   Diagnosis Date    Cancer (HCC) 10/2019    prostate    Colon adenomas 06/24/2020    Essential hypertension     Hearing impairment     Hemorrhoids     High  blood pressure     Hip fracture (HCC) 2021    Hypercholesterolemia     Hyperlipidemia          Past Surgical History:   Procedure Laterality Date    COLONOSCOPY   approx.    COLONOSCOPY  2020    LAPAROSCOPY, SURGICAL PROSTATECTOMY, RETROPUBIC RADICAL, W/NERVE SPARING  2019    RALP + PLND by Dr. Alvarez @ [facility] (pT3a N0)     No Known Allergies   Social History:  Social History     Socioeconomic History    Marital status: Single   Tobacco Use    Smoking status: Former     Packs/day: 0.50     Years: 14.00     Additional pack years: 0.00     Total pack years: 7.00     Types: Cigarettes     Quit date: 1985     Years since quittin.7    Smokeless tobacco: Never   Vaping Use    Vaping Use: Never used   Substance and Sexual Activity    Alcohol use: Not Currently    Drug use: No      Family History:  Family History   Problem Relation Age of Onset    Heart Disorder Father         CFH    Hypertension Mother     Lipids Mother     Cancer Mother         Breast Cancer, 1963 approx.    Hypertension Brother           REVIEW OF SYSTEMS:   Review of Systems   Constitutional:  Negative for fatigue and fever.   Respiratory:  Negative for cough and shortness of breath.    Cardiovascular:  Negative for chest pain, palpitations and leg swelling.   Gastrointestinal:  Negative for vomiting, abdominal pain, diarrhea and constipation.   Musculoskeletal:  Negative for joint pain.   Neurological:  Negative for headaches.            PHYSICAL EXAM:   /82   Pulse 62   Temp 98.4 °F (36.9 °C)   Ht 5' 10\" (1.778 m)   Wt 177 lb 8 oz (80.5 kg)   SpO2 96%   BMI 25.47 kg/m²  Estimated body mass index is 25.47 kg/m² as calculated from the following:    Height as of this encounter: 5' 10\" (1.778 m).    Weight as of this encounter: 177 lb 8 oz (80.5 kg).     Wt Readings from Last 3 Encounters:   02/15/24 177 lb 8 oz (80.5 kg)   08/15/23 170 lb (77.1 kg)   23 183 lb (83 kg)       Physical Exam   Vitals reviewed.    Constitutional: He appears well-developed. No distress.   Cardiovascular:  Normal rate, regular rhythm and normal heart sounds.            No murmur heard.   Edema not present.  Pulmonary/Chest: Effort normal and breath sounds normal. No respiratory distress.   Abdominal: Soft. Bowel sounds are normal. He exhibits no distension. There is no abdominal tenderness.   Neurological: No cranial nerve deficit.           ASSESSMENT AND PLAN:   Patient is a 68 year old male who presents primarily presents for:    Diagnoses and all orders for this visit:    Essential hypertension  -     lisinopril 10 MG Oral Tab; Take 1 tablet (10 mg total) by mouth daily.    Alopecia  -     finasteride 1 MG Oral Tab; Take 1 tablet (1 mg total) by mouth daily.    Hypercholesteremia  -     simvastatin 10 MG Oral Tab; Take 1 tablet (10 mg total) by mouth nightly.           Return in about 6 months (around 8/15/2024), or if symptoms worsen or fail to improve.  Patient indicates understanding of the above recommendations and agrees to the above plan.  Reasurrance and education provided. All questions answered.  Notified to call with any questions, complications, allergies, or worsening or changing symptoms as well as any side effects or complications from the treatments .  Red flags/ ER precautions discussed.    Spent 30 minutes including chart review, reviewing appropriate medical history, evaluating patient, discussing treatment options, counseling and education (diet and exercise), ordering appropriate diagnostic tests and completing documentation.          Meds & Refills for this Visit:  Requested Prescriptions     Signed Prescriptions Disp Refills    finasteride 1 MG Oral Tab 90 tablet 1     Sig: Take 1 tablet (1 mg total) by mouth daily.    lisinopril 10 MG Oral Tab 90 tablet 1     Sig: Take 1 tablet (10 mg total) by mouth daily.    simvastatin 10 MG Oral Tab 90 tablet 1     Sig: Take 1 tablet (10 mg total) by mouth nightly.       No  orders of the defined types were placed in this encounter.      Imaging & Consults:  None    Health Maintenance:  Zoster Vaccines(2 of 3) due on 05/10/2017  Annual Depression Screen due on 12/30/2020  Fall Risk Screening due on 06/09/2021  Annual Physical due on 06/16/2021  PSA due on 09/01/2022  Colonoscopy due on 06/24/2025  Influenza Vaccine Completed  Pneumococcal Vaccine: 65+ Years Completed      Susi Welsh MD

## 2024-03-20 ENCOUNTER — LAB ENCOUNTER (OUTPATIENT)
Dept: LAB | Facility: HOSPITAL | Age: 69
End: 2024-03-20
Attending: UROLOGY
Payer: MEDICARE

## 2024-03-20 DIAGNOSIS — I10 ESSENTIAL HYPERTENSION: ICD-10-CM

## 2024-03-20 DIAGNOSIS — C61 PROSTATE CANCER (HCC): ICD-10-CM

## 2024-03-20 DIAGNOSIS — E78.00 HYPERCHOLESTEREMIA: ICD-10-CM

## 2024-03-20 DIAGNOSIS — D69.6 THROMBOCYTOPENIA (HCC): ICD-10-CM

## 2024-03-20 DIAGNOSIS — D64.9 MILD CHRONIC ANEMIA: ICD-10-CM

## 2024-03-20 LAB
ALBUMIN SERPL-MCNC: 4.5 G/DL (ref 3.2–4.8)
ALBUMIN/GLOB SERPL: 1.7 {RATIO} (ref 1–2)
ALP LIVER SERPL-CCNC: 60 U/L
ALT SERPL-CCNC: 26 U/L
ANION GAP SERPL CALC-SCNC: 5 MMOL/L (ref 0–18)
AST SERPL-CCNC: 25 U/L (ref ?–34)
BASOPHILS # BLD AUTO: 0.03 X10(3) UL (ref 0–0.2)
BASOPHILS NFR BLD AUTO: 0.7 %
BILIRUB SERPL-MCNC: 1.1 MG/DL (ref 0.2–1.1)
BILIRUB UR QL: NEGATIVE
BUN BLD-MCNC: 13 MG/DL (ref 9–23)
BUN/CREAT SERPL: 14.4 (ref 10–20)
CALCIUM BLD-MCNC: 9.5 MG/DL (ref 8.7–10.4)
CHLORIDE SERPL-SCNC: 109 MMOL/L (ref 98–112)
CHOLEST SERPL-MCNC: 175 MG/DL (ref ?–200)
CLARITY UR: CLEAR
CO2 SERPL-SCNC: 29 MMOL/L (ref 21–32)
COLOR UR: COLORLESS
CREAT BLD-MCNC: 0.9 MG/DL
DEPRECATED RDW RBC AUTO: 44.6 FL (ref 35.1–46.3)
EGFRCR SERPLBLD CKD-EPI 2021: 92 ML/MIN/1.73M2 (ref 60–?)
EOSINOPHIL # BLD AUTO: 0.15 X10(3) UL (ref 0–0.7)
EOSINOPHIL NFR BLD AUTO: 3.4 %
ERYTHROCYTE [DISTWIDTH] IN BLOOD BY AUTOMATED COUNT: 12.1 % (ref 11–15)
FASTING PATIENT LIPID ANSWER: YES
FASTING STATUS PATIENT QL REPORTED: YES
GLOBULIN PLAS-MCNC: 2.7 G/DL (ref 2.8–4.4)
GLUCOSE BLD-MCNC: 81 MG/DL (ref 70–99)
GLUCOSE UR-MCNC: NORMAL MG/DL
HCT VFR BLD AUTO: 40 %
HDLC SERPL-MCNC: 64 MG/DL (ref 40–59)
HGB BLD-MCNC: 12.9 G/DL
HGB UR QL STRIP.AUTO: NEGATIVE
IMM GRANULOCYTES # BLD AUTO: 0.01 X10(3) UL (ref 0–1)
IMM GRANULOCYTES NFR BLD: 0.2 %
KETONES UR-MCNC: NEGATIVE MG/DL
LDLC SERPL CALC-MCNC: 96 MG/DL (ref ?–100)
LEUKOCYTE ESTERASE UR QL STRIP.AUTO: NEGATIVE
LYMPHOCYTES # BLD AUTO: 1.14 X10(3) UL (ref 1–4)
LYMPHOCYTES NFR BLD AUTO: 25.6 %
MCH RBC QN AUTO: 32.1 PG (ref 26–34)
MCHC RBC AUTO-ENTMCNC: 32.3 G/DL (ref 31–37)
MCV RBC AUTO: 99.5 FL
MONOCYTES # BLD AUTO: 0.39 X10(3) UL (ref 0.1–1)
MONOCYTES NFR BLD AUTO: 8.7 %
NEUTROPHILS # BLD AUTO: 2.74 X10 (3) UL (ref 1.5–7.7)
NEUTROPHILS # BLD AUTO: 2.74 X10(3) UL (ref 1.5–7.7)
NEUTROPHILS NFR BLD AUTO: 61.4 %
NITRITE UR QL STRIP.AUTO: NEGATIVE
NONHDLC SERPL-MCNC: 111 MG/DL (ref ?–130)
OSMOLALITY SERPL CALC.SUM OF ELEC: 295 MOSM/KG (ref 275–295)
PH UR: 7 [PH] (ref 5–8)
PLATELET # BLD AUTO: 166 10(3)UL (ref 150–450)
POTASSIUM SERPL-SCNC: 4.7 MMOL/L (ref 3.5–5.1)
PROT SERPL-MCNC: 7.2 G/DL (ref 5.7–8.2)
PROT UR-MCNC: NEGATIVE MG/DL
PSA SERPL-MCNC: 0.04 NG/ML (ref ?–4)
RBC # BLD AUTO: 4.02 X10(6)UL
SODIUM SERPL-SCNC: 143 MMOL/L (ref 136–145)
SP GR UR STRIP: 1 (ref 1–1.03)
TRIGL SERPL-MCNC: 80 MG/DL (ref 30–149)
UROBILINOGEN UR STRIP-ACNC: NORMAL
VLDLC SERPL CALC-MCNC: 13 MG/DL (ref 0–30)
WBC # BLD AUTO: 4.5 X10(3) UL (ref 4–11)

## 2024-03-20 PROCEDURE — 36415 COLL VENOUS BLD VENIPUNCTURE: CPT

## 2024-03-20 PROCEDURE — 84153 ASSAY OF PSA TOTAL: CPT

## 2024-03-20 PROCEDURE — 85025 COMPLETE CBC W/AUTO DIFF WBC: CPT

## 2024-03-20 PROCEDURE — 80053 COMPREHEN METABOLIC PANEL: CPT

## 2024-03-20 PROCEDURE — 80061 LIPID PANEL: CPT

## 2024-03-20 PROCEDURE — 81003 URINALYSIS AUTO W/O SCOPE: CPT

## 2024-03-21 ENCOUNTER — OFFICE VISIT (OUTPATIENT)
Dept: SURGERY | Facility: CLINIC | Age: 69
End: 2024-03-21
Payer: MEDICARE

## 2024-03-21 DIAGNOSIS — C61 PROSTATE CANCER (HCC): ICD-10-CM

## 2024-03-21 DIAGNOSIS — R97.21 RISING PSA FOLLOWING TREATMENT FOR MALIGNANT NEOPLASM OF PROSTATE: Primary | ICD-10-CM

## 2024-03-21 PROCEDURE — 99214 OFFICE O/P EST MOD 30 MIN: CPT | Performed by: UROLOGY

## 2024-03-21 NOTE — PROGRESS NOTES
[facility]  Follow-Up Visit    HPI: Erik Brewer is a 68 year old male presents for a follow up visit. Patient was last seen on 9/21/2023.    INTERVAL HISTORY: Here for follow-up on prostate cancer. Doing well; feels 'great'.     PSA 3/2024 became detectable at 0.04 ng/mL.  See trend below.     Reports daytime and nighttime continence. Not using any incontinence pads.     He denies fevers or chills, chest pain or shortness of breath, constipation or diarrhea or gross hematuria. Reports good urinary flow, no nocturia and no no feeling of incomplete emptying.     Patient is not sexually active however reports getting erections and masturbating.      1. Unfavorable Intermediate Risk Prostate Cancer (pT3a N0)  Patient denies family history of prostate cancer.  Elevated screening PSA level of 5.81 ng/mL in 8/2019.      Patient underwent a TRUS-guided prostate biopsy by Dr. Max on 9/30/2019 which revealed:  - Prostate volume on TRUS: 23 cc  - Prostate AdenoCa: GG 4+3=7 in 7/14 cores, up to 75% core involvement (right base, mid, and apex).  Also revealed GG 3+4=7 prostate cancer in 1/14 cores, 10% core involvement (left apex).     Patient underwent a staging multi-parametric MRI of the prostate on 11/15/2018 which revealed a PI-RADS 5 lesion throughout the right side of the peripheral zone with broad-based capsular abutment concerning for early KALEB. Also noted was an enhancing 1.5 cm lesion in the anterior column of the left acetabulum, which is highly concerning for an osseous metastasis.     Nuclear medicine bone scan to further evaluate the suspicious bone lesion demonstrated no definite evidence of osseous metastatic disease.\"  Subtle asymmetry at the junction of the left acetabulum and pubic ramus is nonspecific\". CT of the pelvis failed to reveal the suspicious lesion and as per radiology review the lesion was deemed as almost certainly benign.    No voiding symptoms pre-op. Patient not sexually active.      - 2019: s/p RALP + PLND. Final pathology revealed GG 4+3=7 adenoCa, , bilateral, + KALEB, +PNI, - SVI, close but negative surgical margins, 0/4 LNs. pT3a N0.     - 3/2020 F/U: PSA undetectable. Patient reports being nearly completely continent. Using 1 pad daily for safety. No longer doing Kegel exercises as he feels he doesn't need them anymore.     - 2020 F/U: PSA undetectable. Complete recovery of urinary continence. Not sexually active.     - 3/2021 F/U: PSA undetectable. Continent. Not sexually active.     - 2021 F/U: PSA undetectable. Continent. Not sexually active yet reports having erections and being able to masturbate.     - 3/2022 F/U: PSA undetectable. Continent. Not sexually active yet reports having erections and being able to masturbate.     - 2022 F/U: PSA undetectable. Continent. Not sexually active yet reports having erections and being able to masturbate.     - 3/2023 F/U: PSA undetectable. Continent. Not sexually active yet reports having erections and being able to masturbate.     - 2023 F/U: PSA undetectable. Continent. Not sexually active yet reports having erections and being able to masturbate.     - 3/2024 F/U: PSA became detectable at 0.04 ng/mL.  Continent.  No pads or incontinence products.  Not sexually active yet reports having erections and being able to masturbate.        PMH: HTN, HLD, thrombocytopenia, prostate cancer dx. 2019.     PSH: RALP + PLND (2019), left hip pinning.     FAMILY Hx: No reported family history of  malignancies.  Father is  at the age of 97. Mother passed away at 104 years of age.     SOCIAL Hx: Patient is single and has no biological children.  He is not sexually active.  He is a former smoker and quit in  after smoking 0.5 packs/day for 12 years.  He drinks alcohol socially.  He works as a .    Reviewed past medical, surgical, family, and social history.  Reviewed med list and allergies.      REVIEW OF  SYSTEMS:  Pertinent positives and negatives per HPI. A 10-point review of systems was performed is otherwise negative.      EXAM:  There were no vitals taken for this visit.     Physical Exam    Constitutional: He is oriented to person, place, and time. He appears well-developed. No distress.   HENT:   Head: Normocephalic.   Eyes: No scleral icterus.   Cardiovascular:  Normal rate.            Pulmonary/Chest: Effort normal.   Abdominal: Soft. He exhibits no distension. There is no abdominal tenderness.   Neurological: He is alert and oriented to person, place, and time.   Skin: Skin is warm and dry.   Psychiatric: He has a normal mood and affect. His behavior is normal.       PATHOLOGY:    Surgical Pathology                        Case: [id number]  Provider: Jairon Alvarez MD       Collected: 12/30/2019  Pathologist: Robyn Benz MD     A. Prostate, bilateral seminal vesicles, ampulla of vas; radical prostatectomy:   Prostatic adenocarcinoma, [place] score 7 (4+3), grade group 3.  Tumor is seen bilaterally with predominant right side involvement.  Tumor shows extraprostatic extension with multifocal perineural invasion.  Seminal vesicles are free of carcinoma.  Tumor is seen at a distance of <1 mm from the right and left apical margins and the right posterior inked margin.  Patchy mild chronic prostatitis.  Preliminary pathologic staging pT3a, N0     B. Right pelvic lymph nodes; dissection:   Two lymph nodes, negative for carcinoma (0/2).  Immunohistochemical stain for cytokeratin AE1/AE3 performed on blocks B1 and B2 are negative and support the above diagnosis.      C. Left pelvic lymph nodes; dissection:   Two lymph nodes, negative for carcinoma (0/2).  Immunohistochemical stain for cytokeratin AE1/AE3 performed on blocks C1 and C2 are negative and support the above diagnosis.     D. Periprostatic fat; dissection:   Fibroadipose tissue, negative for carcinoma.      LABS:    Component PSA Screen   Latest  Ref Rng & Units <=4.00 ng/mL   3/20/2024 0.04   8/15/2023 <0.01   3/28/2023 <0.01   9/22/2022 <0.01   3/23/2022 <0.01   9/20/2021 <0.01   3/17/2021 <0.01   9/1/2020 <0.01   2/27/2020 <0.01   8/20/2019 5.81 (H)   4/3/2019 4.46 (H)       IMAGING:    CT Pelvis (12/11/2019):  No suspicious osseous lesions to suggest metastases.  Specifically, no radiographically visible lesion in the anterior column of the left acetabulum.  Given the lack of definitively suspicious uptake on the prior bone scan, the lesion noted on the prior MRI is almost certainly benign relating to etiologies such as a cartilaginous rest; additional benign lesions such as atypical hemangioma/fibrous dysplasia are considered less likely.  Results of this examination were discussed with the patient's physician, Dr. Jairon Alvarez, by Dr. Ugalde at 13:10 on 12/11/2019.        IMPRESSION:  68 year old male with clinically localized unfavorable intermediate risk prostate cancer diagnosed 9/2019. Status post robotic assisted laparoscopic radical prostatectomy and bilateral pelvic lymph node dissection 12/2019. Final pathology revealing pT3aN0 disease with KALEB and PNI; negative margins.     PSA levels have been undetectable up until recently where his PSA from 3/20/2024 became detectable at 0.04 ng/mL.    Discussed results with patient as well as significance of this new finding/development.     I recommend close monitoring with repeat in 3 months instead of 6 months to evaluate kinetics and doubling time.  Further recommendations to follow accordingly.    His recovery following surgery has been excellent and he has completely regained urinary continence.    Discussed erectile function. Patient reports being able to achieve erections and masturbate. He is not sexually active with a partner at this point and is not interested in further assistance.    All questions answered.      PLAN:  1.  Monitor PSA levels that are now detectable following radical  prostatectomy in 2019.  Repeat in 3 months with follow-up thereafter.    RTC for follow-up in 3 months with a PSA level performed prior to appointment.      Jairon Alvarez MD  3/21/2024

## 2024-06-25 ENCOUNTER — LAB ENCOUNTER (OUTPATIENT)
Dept: LAB | Facility: HOSPITAL | Age: 69
End: 2024-06-25
Attending: UROLOGY

## 2024-06-25 DIAGNOSIS — R97.21 RISING PSA FOLLOWING TREATMENT FOR MALIGNANT NEOPLASM OF PROSTATE: ICD-10-CM

## 2024-06-25 DIAGNOSIS — C61 PROSTATE CANCER (HCC): ICD-10-CM

## 2024-06-25 LAB — PSA SERPL-MCNC: 0.05 NG/ML (ref ?–4)

## 2024-06-25 PROCEDURE — 84153 ASSAY OF PSA TOTAL: CPT

## 2024-06-25 PROCEDURE — 36415 COLL VENOUS BLD VENIPUNCTURE: CPT

## 2024-06-27 ENCOUNTER — OFFICE VISIT (OUTPATIENT)
Dept: SURGERY | Facility: CLINIC | Age: 69
End: 2024-06-27

## 2024-06-27 DIAGNOSIS — R97.21 RISING PSA FOLLOWING TREATMENT FOR MALIGNANT NEOPLASM OF PROSTATE: Primary | ICD-10-CM

## 2024-06-27 DIAGNOSIS — C61 PROSTATE CANCER (HCC): ICD-10-CM

## 2024-06-27 PROCEDURE — 99214 OFFICE O/P EST MOD 30 MIN: CPT | Performed by: UROLOGY

## 2024-06-27 NOTE — PROGRESS NOTES
[facility]  Follow-Up Visit    HPI: Erik Brewer is a 69 year old male presents for a follow up visit. Patient was last seen on 3/21/24.    INTERVAL HISTORY: Here for follow-up on prostate cancer.  Status post robotic radical prostatectomy with pelvic lymph node dissection 12/2019.  Final pathology revealed pT3a pN0 R0 PCa.    PSA undetectable until 3/2024 where it became detectable at 0.04 ng/mL.      PSA 6/25/24 slightly increased to 0.05 ng/mL.    Reports daytime and nighttime continence. Not using any incontinence pads.     He denies fevers or chills, chest pain or shortness of breath, constipation or diarrhea or gross hematuria. Reports good urinary flow, no nocturia and no no feeling of incomplete emptying.     Patient is not sexually active however reports getting erections and masturbating.      1. Unfavorable Intermediate Risk Prostate Cancer (pT3a N0)  Patient denies family history of prostate cancer.  Elevated screening PSA level of 5.81 ng/mL in 8/2019.      Patient underwent a TRUS-guided prostate biopsy by Dr. Max on 9/30/2019 which revealed:  - Prostate volume on TRUS: 23 cc  - Prostate AdenoCa: GG 4+3=7 in 7/14 cores, up to 75% core involvement (right base, mid, and apex).  Also revealed GG 3+4=7 prostate cancer in 1/14 cores, 10% core involvement (left apex).     Patient underwent a staging multi-parametric MRI of the prostate on 11/15/2018 which revealed a PI-RADS 5 lesion throughout the right side of the peripheral zone with broad-based capsular abutment concerning for early KALEB. Also noted was an enhancing 1.5 cm lesion in the anterior column of the left acetabulum, which is highly concerning for an osseous metastasis.     Nuclear medicine bone scan to further evaluate the suspicious bone lesion demonstrated no definite evidence of osseous metastatic disease.\"  Subtle asymmetry at the junction of the left acetabulum and pubic ramus is nonspecific\". CT of the pelvis failed to reveal  the suspicious lesion and as per radiology review the lesion was deemed as almost certainly benign.    No voiding symptoms pre-op. Patient not sexually active.     - 2019: s/p RALP + PLND. Final pathology revealed GG 4+3=7 adenoCa, , bilateral, + KALEB, +PNI, - SVI, close but negative surgical margins, 0/4 LNs. pT3a N0.     - 3/2020 F/U: PSA undetectable. Patient reports being nearly completely continent. Using 1 pad daily for safety. No longer doing Kegel exercises as he feels he doesn't need them anymore.     - 2020 F/U: PSA undetectable. Complete recovery of urinary continence. Not sexually active.     - 3/2021 F/U: PSA undetectable. Continent. Not sexually active.     - 2021 F/U: PSA undetectable. Continent. Not sexually active yet reports having erections and being able to masturbate.     - 3/2022 F/U: PSA undetectable. Continent. Not sexually active yet reports having erections and being able to masturbate.     - 2022 F/U: PSA undetectable. Continent. Not sexually active yet reports having erections and being able to masturbate.     - 3/2023 F/U: PSA undetectable. Continent. Not sexually active yet reports having erections and being able to masturbate.     - 2023 F/U: PSA undetectable. Continent. Not sexually active yet reports having erections and being able to masturbate.     - 3/2024 F/U: PSA became detectable at 0.04 ng/mL.  Continent.  No pads or incontinence products.  Not sexually active yet reports having erections and being able to masturbate.    - 2024 F/U: PSA 0.05 ng/mL.  Continent.  No pads or incontinence products.  Not sexually active however able to have erections.        PMH: HTN, HLD, thrombocytopenia, prostate cancer dx. 2019.     PSH: RALP + PLND (2019), left hip pinning.     FAMILY Hx: No reported family history of  malignancies.  Father is  at the age of 97. Mother passed away at 104 years of age.     SOCIAL Hx: Patient is single and has no biological  children.  He is not sexually active.  He is a former smoker and quit in 1984 after smoking 0.5 packs/day for 12 years.  He drinks alcohol socially.  He works as a .    Reviewed past medical, surgical, family, and social history.  Reviewed med list and allergies.      REVIEW OF SYSTEMS:  Pertinent positives and negatives per HPI. A 10-point review of systems was performed is otherwise negative.      EXAM:  There were no vitals taken for this visit.     Physical Exam    Constitutional: He is oriented to person, place, and time. He appears well-developed. No distress.   HENT:   Head: Normocephalic.   Eyes: No scleral icterus.   Cardiovascular:  Normal rate.            Pulmonary/Chest: Effort normal.   Abdominal: Soft. He exhibits no distension. There is no abdominal tenderness.   Neurological: He is alert and oriented to person, place, and time.   Skin: Skin is warm and dry.   Psychiatric: He has a normal mood and affect. His behavior is normal.       PATHOLOGY:    Surgical Pathology                        Case: [id number]  Provider: Jairon Alvarez MD       Collected: 12/30/2019  Pathologist: Robyn Benz MD     A. Prostate, bilateral seminal vesicles, ampulla of vas; radical prostatectomy:   Prostatic adenocarcinoma, [place] score 7 (4+3), grade group 3.  Tumor is seen bilaterally with predominant right side involvement.  Tumor shows extraprostatic extension with multifocal perineural invasion.  Seminal vesicles are free of carcinoma.  Tumor is seen at a distance of <1 mm from the right and left apical margins and the right posterior inked margin.  Patchy mild chronic prostatitis.  Preliminary pathologic staging pT3a, N0     B. Right pelvic lymph nodes; dissection:   Two lymph nodes, negative for carcinoma (0/2).  Immunohistochemical stain for cytokeratin AE1/AE3 performed on blocks B1 and B2 are negative and support the above diagnosis.      C. Left pelvic lymph nodes; dissection:   Two  lymph nodes, negative for carcinoma (0/2).  Immunohistochemical stain for cytokeratin AE1/AE3 performed on blocks C1 and C2 are negative and support the above diagnosis.     D. Periprostatic fat; dissection:   Fibroadipose tissue, negative for carcinoma.      LABS:    Component PSA Screen   Latest Ref Rng & Units <=4.00 ng/mL   6/25/2024 0.05   3/20/2024 0.04   8/15/2023 <0.01   3/28/2023 <0.01   9/22/2022 <0.01   3/23/2022 <0.01   9/20/2021 <0.01   3/17/2021 <0.01   9/1/2020 <0.01   2/27/2020 <0.01   8/20/2019 5.81 (H)   4/3/2019 4.46 (H)       IMAGING:    CT Pelvis (12/11/2019):  No suspicious osseous lesions to suggest metastases.  Specifically, no radiographically visible lesion in the anterior column of the left acetabulum.  Given the lack of definitively suspicious uptake on the prior bone scan, the lesion noted on the prior MRI is almost certainly benign relating to etiologies such as a cartilaginous rest; additional benign lesions such as atypical hemangioma/fibrous dysplasia are considered less likely.  Results of this examination were discussed with the patient's physician, Dr. Jairon Alvarez, by Dr. Ugalde at 13:10 on 12/11/2019.        IMPRESSION:  69 year old male with clinically localized unfavorable intermediate risk prostate cancer diagnosed 9/2019. Status post robotic assisted laparoscopic radical prostatectomy and bilateral pelvic lymph node dissection 12/2019. Final pathology revealing pT3aN0 disease with KALEB and PNI; negative margins.     PSA levels have been undetectable up until 3/2024 when his PSA became detectable at 0.04 ng/mL.    Recent PSA slightly increased to 0.05 yet largely stable.    Discussed results with patient at length.    Patient still does not meet criteria for PSA/biochemical recurrence following radical prostatectomy.  I recommend close monitoring with repeat in 3 months to evaluate kinetics and doubling time.  Further recommendations to follow accordingly.    We briefly  discussed indications for salvage radiation therapy and hormonal therapy.    His recovery following surgery has been excellent and he has completely regained urinary continence.    Discussed erectile function. Patient reports being able to achieve erections and masturbate. He is not sexually active with a partner at this point and is not interested in further assistance.    All questions answered.      PLAN:  1.  Monitor PSA levels that are now detectable following radical prostatectomy in 2019.  Repeat in 3 months with follow-up thereafter.    RTC for follow-up in 3 months with a PSA level performed prior to appointment.      Jairon Alvarez MD  6/27/2024

## 2024-07-21 ENCOUNTER — PATIENT MESSAGE (OUTPATIENT)
Dept: INTERNAL MEDICINE CLINIC | Facility: CLINIC | Age: 69
End: 2024-07-21

## 2024-07-22 ENCOUNTER — NURSE TRIAGE (OUTPATIENT)
Dept: INTERNAL MEDICINE CLINIC | Facility: CLINIC | Age: 69
End: 2024-07-22

## 2024-07-22 NOTE — TELEPHONE ENCOUNTER
Spoke with patient. Appointment given per Dr. Dubon request.   Future Appointments   Date Time Provider Department Center   7/23/2024 11:20 AM Susi Junior MD [id number]   8/15/2024 10:40 AM Susi Junior MD [facility]   9/30/2024 10:00 AM Jairon Alvarez MD [facility]

## 2024-07-22 NOTE — TELEPHONE ENCOUNTER
From: Erik Brewer  To: Susi Welsh  Sent: 7/21/2024 5:18 PM CDT  Subject: Leg /Arm ?Rash?    Hello Dr. Welsh & staff-    Approximately seven days ago I developed a large rash on my lower legs and to a lesser extent my forearms.    Thought possibly insect bites, outdoor activity etc. ??    Whoo, itching. Trying to keep my hands away...    Have been treating the problem with soap & water washes several times a day, Chlorohexidine and applications of Neosporin.    My skin condition does not seem to be responding and so I am asking if you recommend a look into this problem before it becomes more problematic.      (Blood infection, Sepsis etc.)    According to --'Google-Doc'--, this may be a case of Cellulitis.     (But what does Google or I know?)    Tried to send photos to you but cannot 'paste' to this note. Mmmm...    Please let me know your recommendations.    Best Regards,    -Erik Brewer  [phone]  [email or web address]      .

## 2024-07-22 NOTE — TELEPHONE ENCOUNTER
Spoke to Erik who stated for the past 7 days on bilateral lower legs and forearms. Patient described his rash as red raised rash size end of pen with numerous sites on shins, calves and behind his knees. Patient reported itching mild-moderate. Patient denied swelling, erythema, fever, or drainage.     Disposition: Seen today or tomorrow     Dr. Spivey does not have appointments today or tomorrow so advised to [facility]. Patient agreed to plan and voiced understanding.     Reason for Disposition   Mild widespread rash  (Exception: Heat rash lasting 3 days or less.)    Answer Assessment - Initial Assessment Questions  1. APPEARANCE of RASH: \"Describe the rash.\" (e.g., spots, blisters, raised areas, skin peeling, scaly)     Red raised dots  2. SIZE: \"How big are the spots?\" (e.g., tip of pen, eraser, coin; inches, centimeters)      Tip of pen  3. LOCATION: \"Where is the rash located?\"      Bilateral lower legs and forearms.   4. COLOR: \"What color is the rash?\" (Note: It is difficult to assess rash color in people with darker-colored skin. When this situation occurs, simply ask the caller to describe what they see.)      Red  5. ONSET: \"When did the rash begin?\"      7 days  6. FEVER: \"Do you have a fever?\" If Yes, ask: \"What is your temperature, how was it measured, and when did it start?\"      Denied  7. ITCHING: \"Does the rash itch?\" If Yes, ask: \"How bad is the itch?\" (Scale 1-10; or mild, moderate, severe)      Intermittent mild-moderate.  8. CAUSE: \"What do you think is causing the rash?\"      Unknown   9. MEDICINE FACTORS: \"Have you started any new medicines within the last 2 weeks?\" (e.g., antibiotics)       Neosporin  10. OTHER SYMPTOMS: \"Do you have any other symptoms?\" (e.g., dizziness, headache, sore throat, joint pain)        Denied  11. PREGNANCY: \"Is there any chance you are pregnant?\" \"When was your last menstrual period?\"        N/A    Protocols used: Rash or Redness - Widespread-A-OH

## 2024-07-23 ENCOUNTER — OFFICE VISIT (OUTPATIENT)
Dept: INTERNAL MEDICINE CLINIC | Facility: CLINIC | Age: 69
End: 2024-07-23
Payer: MEDICARE

## 2024-07-23 VITALS
TEMPERATURE: 98 F | HEART RATE: 63 BPM | BODY MASS INDEX: 26.92 KG/M2 | DIASTOLIC BLOOD PRESSURE: 80 MMHG | HEIGHT: 70 IN | SYSTOLIC BLOOD PRESSURE: 132 MMHG | OXYGEN SATURATION: 99 % | WEIGHT: 188 LBS

## 2024-07-23 DIAGNOSIS — L23.9 ALLERGIC CONTACT DERMATITIS, UNSPECIFIED TRIGGER: Primary | ICD-10-CM

## 2024-07-23 PROCEDURE — 99214 OFFICE O/P EST MOD 30 MIN: CPT | Performed by: FAMILY MEDICINE

## 2024-07-23 RX ORDER — PREDNISONE 20 MG/1
TABLET ORAL
Qty: 9 TABLET | Refills: 0 | Status: SHIPPED | OUTPATIENT
Start: 2024-07-23 | End: 2024-07-30

## 2024-07-23 RX ORDER — HYDROXYZINE HYDROCHLORIDE 25 MG/1
25 TABLET, FILM COATED ORAL EVERY 8 HOURS PRN
Qty: 15 TABLET | Refills: 0 | Status: SHIPPED | OUTPATIENT
Start: 2024-07-23

## 2024-07-23 RX ORDER — TRIAMCINOLONE ACETONIDE 1 MG/G
CREAM TOPICAL 2 TIMES DAILY PRN
Qty: 45 G | Refills: 0 | Status: SHIPPED | OUTPATIENT
Start: 2024-07-23

## 2024-07-27 NOTE — PROGRESS NOTES
HPI:     Chief Complaint   Patient presents with    Rash Skin Problem     Bilateral leg and arm rash        Erik Brewer is a 69 year old male presenting for:  For the past 1wk having a very itchy rash in legs b/l. Has spread slightly to arms.  Not painful  Prior to this was doing yard work and cleaning out bushes.   No woods  Unsure if poison ivy exposure  No blisters  Tried hibiclens and topical Abx      Results for orders placed or performed in visit on 06/25/24   PSA Diagnostic [E]   Result Value Ref Range    Total PSA  0.05 <=4.00 ng/mL       Labs:   No results found for: \"A1C\"   Lab Results   Component Value Date/Time    CHOLEST 175 03/20/2024 11:19 AM    HDL 64 (H) 03/20/2024 11:19 AM    TRIG 80 03/20/2024 11:19 AM    LDL 96 03/20/2024 11:19 AM    NONHDLC 111 03/20/2024 11:19 AM       Lab Results   Component Value Date/Time    GLU 81 03/20/2024 11:19 AM     03/20/2024 11:19 AM    K 4.7 03/20/2024 11:19 AM     03/20/2024 11:19 AM    CO2 29.0 03/20/2024 11:19 AM    CREATSERUM 0.90 03/20/2024 11:19 AM    CA 9.5 03/20/2024 11:19 AM    ALB 4.5 03/20/2024 11:19 AM    TP 7.2 03/20/2024 11:19 AM    ALKPHO 60 03/20/2024 11:19 AM    AST 25 03/20/2024 11:19 AM    ALT 26 03/20/2024 11:19 AM    BILT 1.1 03/20/2024 11:19 AM    TSH 2.850 06/14/2020 09:58 AM          Medications:  Current Outpatient Medications   Medication Sig Dispense Refill    predniSONE 20 MG Oral Tab Take 2 tablets (40 mg total) by mouth daily for 3 days, THEN 1 tablet (20 mg total) daily for 2 days, THEN 0.5 tablets (10 mg total) daily for 2 days. 9 tablet 0    triamcinolone 0.1 % External Cream Apply topically 2 (two) times daily as needed. rash 45 g 0    hydrOXYzine 25 MG Oral Tab Take 1 tablet (25 mg total) by mouth every 8 (eight) hours as needed for Itching. 15 tablet 0    finasteride 1 MG Oral Tab Take 1 tablet (1 mg total) by mouth daily. 90 tablet 1    lisinopril 10 MG Oral Tab Take 1 tablet (10 mg total) by mouth daily. 90  tablet 1    simvastatin 10 MG Oral Tab Take 1 tablet (10 mg total) by mouth nightly. 90 tablet 1      Past Medical History:    Cancer (HCC)    prostate    Colon adenomas    Essential hypertension    Hearing impairment    Hemorrhoids    High blood pressure    Hip fracture (HCC)    Hypercholesterolemia    Hyperlipidemia         Past Surgical History:   Procedure Laterality Date    Colonoscopy   approx.    Colonoscopy  2020    Laparoscopy, surgical prostatectomy, retropubic radical, w/nerve sparing  2019    RALP + PLND by Dr. Alvarez @ [facility] (pT3a N0)     No Known Allergies   Social History:  Social History     Socioeconomic History    Marital status: Single   Tobacco Use    Smoking status: Former     Current packs/day: 0.00     Average packs/day: 0.5 packs/day for 14.0 years (7.0 ttl pk-yrs)     Types: Cigarettes     Start date: 1971     Quit date: 1985     Years since quittin.1    Smokeless tobacco: Never   Vaping Use    Vaping status: Never Used   Substance and Sexual Activity    Alcohol use: Not Currently    Drug use: No      Family History:  Family History   Problem Relation Age of Onset    Heart Disorder Father         CFH    Hypertension Mother     Lipids Mother     Cancer Mother         Breast Cancer, 1963 approx.    Hypertension Brother           REVIEW OF SYSTEMS:   Review of Systems   Constitutional:  Negative for chills, fatigue and fever.   Respiratory:  Negative for cough and shortness of breath.    Cardiovascular:  Negative for chest pain, palpitations and leg swelling.   Gastrointestinal:  Negative for abdominal pain, constipation, diarrhea and vomiting.   Skin:  Positive for rash.   Neurological:  Negative for headaches.            PHYSICAL EXAM:   /80   Pulse 63   Temp 97.8 °F (36.6 °C)   Ht 5' 10\" (1.778 m)   Wt 188 lb (85.3 kg)   SpO2 99%   BMI 26.98 kg/m²  Estimated body mass index is 26.98 kg/m² as calculated from the following:    Height as of this  encounter: 5' 10\" (1.778 m).    Weight as of this encounter: 188 lb (85.3 kg).     Wt Readings from Last 3 Encounters:   07/23/24 188 lb (85.3 kg)   02/15/24 177 lb 8 oz (80.5 kg)   08/15/23 170 lb (77.1 kg)       Physical Exam  Vitals reviewed.   Constitutional:       General: He is not in acute distress.     Appearance: He is well-developed.   Cardiovascular:      Rate and Rhythm: Normal rate and regular rhythm.      Heart sounds: Normal heart sounds. No murmur heard.  Pulmonary:      Effort: Pulmonary effort is normal. No respiratory distress.      Breath sounds: Normal breath sounds.   Abdominal:      General: Bowel sounds are normal. There is no distension.      Palpations: Abdomen is soft.      Tenderness: There is no abdominal tenderness.   Musculoskeletal:      Right lower leg: No edema.      Left lower leg: No edema.   Skin:     Comments: B/l Lower legs with erythematous patches and maculopapules. Few in arms    Neurological:      General: No focal deficit present.             ASSESSMENT AND PLAN:   Patient is a 69 year old male who presents primarily presents for:    Diagnoses and all orders for this visit:    Allergic contact dermatitis, unspecified trigger  -     predniSONE 20 MG Oral Tab; Take 2 tablets (40 mg total) by mouth daily for 3 days, THEN 1 tablet (20 mg total) daily for 2 days, THEN 0.5 tablets (10 mg total) daily for 2 days.  -     triamcinolone 0.1 % External Cream; Apply topically 2 (two) times daily as needed. rash  -     hydrOXYzine 25 MG Oral Tab; Take 1 tablet (25 mg total) by mouth every 8 (eight) hours as needed for Itching.       -possible poison ivy or othter gardening exposure  -no suspicion for superimposed cellulitis at this time  -supportive care discussed    Return if symptoms worsen or fail to improve.  Patient indicates understanding of the above recommendations and agrees to the above plan.  Reasurrance and education provided. All questions answered.  Notified to call with  any questions, complications, allergies, or worsening or changing symptoms as well as any side effects or complications from the treatments .  Red flags/ ER precautions discussed.    Spent 30 minutes including chart review, reviewing appropriate medical history, evaluating patient, discussing treatment options, counseling and education (diet and exercise), ordering appropriate diagnostic tests and completing documentation.        Meds & Refills for this Visit:  Requested Prescriptions     Signed Prescriptions Disp Refills    predniSONE 20 MG Oral Tab 9 tablet 0     Sig: Take 2 tablets (40 mg total) by mouth daily for 3 days, THEN 1 tablet (20 mg total) daily for 2 days, THEN 0.5 tablets (10 mg total) daily for 2 days.    triamcinolone 0.1 % External Cream 45 g 0     Sig: Apply topically 2 (two) times daily as needed. rash    hydrOXYzine 25 MG Oral Tab 15 tablet 0     Sig: Take 1 tablet (25 mg total) by mouth every 8 (eight) hours as needed for Itching.       No orders of the defined types were placed in this encounter.      Imaging & Consults:  None    Health Maintenance:  Health Maintenance   Topic Date Due    Annual Depression Screening  01/01/2024    Fall Risk Screening (Annual)  01/01/2024    COVID-19 Vaccine (7 - 2023-24 season) 02/11/2024    Annual Physical  08/15/2024    Influenza Vaccine (1) 10/01/2024    Colorectal Cancer Screening  06/24/2025    PSA  06/25/2026    Pneumococcal Vaccine: 65+ Years  Completed    Zoster Vaccines  Completed         Susi Welsh MD

## 2024-08-08 DIAGNOSIS — E78.00 HYPERCHOLESTEREMIA: ICD-10-CM

## 2024-08-08 DIAGNOSIS — L65.9 ALOPECIA: ICD-10-CM

## 2024-08-08 DIAGNOSIS — I10 ESSENTIAL HYPERTENSION: ICD-10-CM

## 2024-08-08 RX ORDER — FINASTERIDE 1 MG/1
1 TABLET, FILM COATED ORAL DAILY
Qty: 90 TABLET | Refills: 0 | Status: SHIPPED | OUTPATIENT
Start: 2024-08-08

## 2024-08-08 RX ORDER — LISINOPRIL 10 MG/1
10 TABLET ORAL DAILY
Qty: 90 TABLET | Refills: 0 | Status: SHIPPED | OUTPATIENT
Start: 2024-08-08

## 2024-08-08 RX ORDER — SIMVASTATIN 10 MG
10 TABLET ORAL NIGHTLY
Qty: 90 TABLET | Refills: 0 | Status: SHIPPED | OUTPATIENT
Start: 2024-08-08

## 2024-08-15 ENCOUNTER — OFFICE VISIT (OUTPATIENT)
Dept: INTERNAL MEDICINE CLINIC | Facility: CLINIC | Age: 69
End: 2024-08-15
Payer: MEDICARE

## 2024-08-15 VITALS
BODY MASS INDEX: 25.39 KG/M2 | HEIGHT: 70 IN | DIASTOLIC BLOOD PRESSURE: 76 MMHG | TEMPERATURE: 98 F | WEIGHT: 177.38 LBS | HEART RATE: 64 BPM | SYSTOLIC BLOOD PRESSURE: 118 MMHG | OXYGEN SATURATION: 94 %

## 2024-08-15 DIAGNOSIS — R91.1 PULMONARY NODULE: ICD-10-CM

## 2024-08-15 DIAGNOSIS — I10 ESSENTIAL HYPERTENSION: ICD-10-CM

## 2024-08-15 DIAGNOSIS — Z12.83 SKIN EXAM, SCREENING FOR CANCER: ICD-10-CM

## 2024-08-15 DIAGNOSIS — D69.6 THROMBOCYTOPENIA (HCC): ICD-10-CM

## 2024-08-15 DIAGNOSIS — L65.9 ALOPECIA: ICD-10-CM

## 2024-08-15 DIAGNOSIS — Z13.6 SCREENING FOR CARDIOVASCULAR CONDITION: ICD-10-CM

## 2024-08-15 DIAGNOSIS — Z00.00 ENCOUNTER FOR ANNUAL HEALTH EXAMINATION: Primary | ICD-10-CM

## 2024-08-15 DIAGNOSIS — Z85.46 HISTORY OF PROSTATE CANCER: ICD-10-CM

## 2024-08-15 DIAGNOSIS — E78.00 HYPERCHOLESTEREMIA: ICD-10-CM

## 2024-08-15 DIAGNOSIS — H91.90 HEARING LOSS, UNSPECIFIED HEARING LOSS TYPE, UNSPECIFIED LATERALITY: ICD-10-CM

## 2024-08-15 DIAGNOSIS — D64.9 MILD CHRONIC ANEMIA: ICD-10-CM

## 2024-08-15 DIAGNOSIS — L81.9 ATYPICAL PIGMENTED SKIN LESION: ICD-10-CM

## 2024-08-15 PROCEDURE — 99214 OFFICE O/P EST MOD 30 MIN: CPT | Performed by: FAMILY MEDICINE

## 2024-08-15 PROCEDURE — G0439 PPPS, SUBSEQ VISIT: HCPCS | Performed by: FAMILY MEDICINE

## 2024-08-15 RX ORDER — SIMVASTATIN 10 MG
10 TABLET ORAL NIGHTLY
Qty: 90 TABLET | Refills: 1 | Status: SHIPPED | OUTPATIENT
Start: 2024-08-15

## 2024-08-15 RX ORDER — LISINOPRIL 10 MG/1
10 TABLET ORAL DAILY
Qty: 90 TABLET | Refills: 1 | Status: SHIPPED | OUTPATIENT
Start: 2024-08-15

## 2024-08-15 RX ORDER — FINASTERIDE 1 MG/1
1 TABLET, FILM COATED ORAL DAILY
Qty: 90 TABLET | Refills: 1 | Status: SHIPPED | OUTPATIENT
Start: 2024-08-15

## 2024-08-15 NOTE — PROGRESS NOTES
Subjective:   Erik Brewer is a 69 year old male who presents for a Medicare Subsequent Annual Wellness visit (Pt already had Initial Annual Wellness) and scheduled follow up of multiple significant but stable problems.       History/Other:   Fall Risk Assessment:   He has been screened for Falls and is low risk.      Cognitive Assessment:   He had a completely normal cognitive assessment - see flowsheet entries     Functional Ability/Status:   Erik Brewer has some abnormal functions as listed below:  He has Hearing problems based on screening of functional status.He has Vision problems based on screening of functional status.       Depression Screening (PHQ):  PHQ-2 SCORE: 0  , done 8/13/2024        <5 minutes spent screening and counseling for depression    Advanced Directives:   He does NOT have a Living Will. [Do you have a living will?: No]  He does NOT have a Power of  for Health Care. [Do you have a healthcare power of ?: No]  Discussed Advance Care Planning with patient (and family/surrogate if present). Standard forms made available to patient in After Visit Summary.      Patient Active Problem List   Diagnosis    Essential hypertension    Hypercholesteremia    Thrombocytopenia (HCC)    Hearing loss    History of prostate cancer    Mild chronic anemia    Alopecia     Allergies:  He has No Known Allergies.    Current Medications:  Outpatient Medications Marked as Taking for the 8/15/24 encounter (Office Visit) with Susi Junior MD   Medication Sig    finasteride 1 MG Oral Tab Take 1 tablet (1 mg total) by mouth daily.    lisinopril 10 MG Oral Tab Take 1 tablet (10 mg total) by mouth daily.    simvastatin 10 MG Oral Tab Take 1 tablet (10 mg total) by mouth nightly.    triamcinolone 0.1 % External Cream Apply topically 2 (two) times daily as needed. rash       Medical History:  He  has a past medical history of Cancer (HCC) (10/2019), Colon adenomas (06/24/2020), Essential  hypertension, Hearing impairment, Hemorrhoids, High blood pressure, Hip fracture (HCC) (2021), Hypercholesterolemia, and Hyperlipidemia.  Surgical History:  He  has a past surgical history that includes laparoscopy, surgical prostatectomy, retropubic radical, w/nerve sparing (2019); colonoscopy ( approx.); and colonoscopy (2020).   Family History:  His family history includes Cancer in his mother; Heart Disorder in his father; Hypertension in his brother and mother; Lipids in his mother.  Social History:  He  reports that he quit smoking about 39 years ago. He started smoking about 53 years ago. He has a 7 pack-year smoking history. He has never used smokeless tobacco. He reports that he does not currently use alcohol. He reports that he does not use drugs.    Tobacco:  He smoked tobacco in the past but quit greater than 12 months ago.  Social History     Tobacco Use   Smoking Status Former    Current packs/day: 0.00    Average packs/day: 0.5 packs/day for 14.0 years (7.0 ttl pk-yrs)    Types: Cigarettes    Start date: 1971    Quit date: 1985    Years since quittin.2   Smokeless Tobacco Never          CAGE Alcohol Screen:   CAGE screening score of 0 on 2024, showing low risk of alcohol abuse.      Patient Care Team:  Susi Junior MD as PCP - General (Family Medicine)    Review of Systems       Objective:   Physical Exam     /76   Pulse 64   Temp 97.9 °F (36.6 °C)   Ht 5' 10\" (1.778 m)   Wt 177 lb 6.4 oz (80.5 kg)   SpO2 94%   BMI 25.45 kg/m²  Estimated body mass index is 25.45 kg/m² as calculated from the following:    Height as of this encounter: 5' 10\" (1.778 m).    Weight as of this encounter: 177 lb 6.4 oz (80.5 kg).    Medicare Hearing Assessment:   Hearing Screening    Screening Method: Finger Rub  Finger Rub Result: Pass         Visual Acuity:   Right Eye Visual Acuity: Corrected Right Eye Chart Acuity: 20/20   Left Eye Visual Acuity: Corrected  Left Eye Chart Acuity: 20/20   Both Eyes Visual Acuity: Corrected Both Eyes Chart Acuity: 20/20   Able To Tolerate Visual Acuity: Yes        Assessment & Plan:   Erik Brewer is a 69 year old male who presents for a Medicare Assessment.     1. Encounter for annual health examination (Primary)  -     CBC With Differential With Platelet; Future; Expected date: 02/11/2025  -     Comp Metabolic Panel (14); Future; Expected date: 02/11/2025  -     Hemoglobin A1C; Future; Expected date: 02/11/2025  -     Lipid Panel; Future; Expected date: 02/11/2025  -     TSH W Reflex To Free T4; Future; Expected date: 02/11/2025  -     Urinalysis, Routine; Future; Expected date: 02/11/2025    -     Pt reassured and all questions answered.  -     Age/sex specific preventive measures/immunizations reviewed and discussed with pt.  -     Pt counseled with regards to diet and exercise.    2. Essential hypertension  -     Lisinopril; Take 1 tablet (10 mg total) by mouth daily.  Dispense: 90 tablet; Refill: 1  -     Comp Metabolic Panel (14); Future; Expected date: 02/11/2025  -     TSH W Reflex To Free T4; Future; Expected date: 02/11/2025  -     Urinalysis, Routine; Future; Expected date: 02/11/2025    -stable; controlled  -continue meds  -watch salt intake, regular exercise, DASH/heart healthy eating  -monitor home BP regularly and maintain log; if elevated reading >160/100 notify Md.    3. Hypercholesteremia  -     Simvastatin; Take 1 tablet (10 mg total) by mouth nightly.  Dispense: 90 tablet; Refill: 1  -     Lipid Panel; Future; Expected date: 02/11/2025  4. History of prostate cancer  -CPM with urology    5. Mild chronic anemia  -     CBC With Differential With Platelet; Future; Expected date: 02/11/2025  6. Alopecia  -     Finasteride; Take 1 tablet (1 mg total) by mouth daily.  Dispense: 90 tablet; Refill: 1  7. Thrombocytopenia (HCC)  -     CBC With Differential With Platelet; Future; Expected date: 02/11/2025  8. Hearing loss,  unspecified hearing loss type, unspecified laterality  -continue hearing aids    9. Pulmonary nodule  -     CT CHEST (CPT=71250); Future; Expected date: 08/15/2024  10. Screening for cardiovascular condition  -     CARD US PV SCREENING SELF PAY; Future; Expected date: 08/15/2024  11. Skin exam, screening for cancer  12. Atypical pigmented skin lesion  -     Derm Referral - Cath[facility] ([place])  The patient indicates understanding of these issues and agrees to the plan.  Reinforced healthy diet, lifestyle, and exercise.      Return in about 6 months (around 2/15/2025) for Blood Pressure follow-up.     Spent 40 minutes (10min preventative and 30min acute/chronic)  including chart review, reviewing appropriate medical history, evaluating patient, discussing treatment options, counseling and education (diet and exercise), ordering appropriate diagnostic tests and completing documentation.      Susi Welsh MD, 8/15/2024     Supplementary Documentation:   General Health:  In the past six months, have you lost more than 10 pounds without trying?: 2 - No  Has your appetite been poor?: No  Type of Diet: Balanced  How does the patient maintain a good energy level?: Appropriate Exercise  How would you describe your daily physical activity?: Moderate  How would you describe your current health state?: Good  How do you maintain positive mental well-being?: Social Interaction  On a scale of 0 to 10, with 0 being no pain and 10 being severe pain, what is your pain level?: 0 - (None)  In the past six months, have you experienced urine leakage?: 0-No  At any time do you feel concerned for the safety/well-being of yourself and/or your children, in your home or elsewhere?: No  Have you had any immunizations at another office such as Influenza, Hepatitis B, Tetanus, or Pneumococcal?: No    Health Maintenance   Topic Date Due    Annual Depression Screening  01/01/2024    COVID-19 Vaccine (7 - 2023-24 season) 02/11/2024     Annual Physical  08/15/2024    Influenza Vaccine (1) 10/01/2024    Colorectal Cancer Screening  06/24/2025    PSA  06/25/2026    Fall Risk Screening (Annual)  Completed    Pneumococcal Vaccine: 65+ Years  Completed    Zoster Vaccines  Completed

## 2024-08-28 ENCOUNTER — HOSPITAL ENCOUNTER (OUTPATIENT)
Dept: CT IMAGING | Age: 69
Discharge: HOME | End: 2024-08-28
Attending: FAMILY MEDICINE
Payer: MEDICARE

## 2024-08-28 DIAGNOSIS — R91.1 PULMONARY NODULE: ICD-10-CM

## 2024-08-28 PROCEDURE — 71250 CT THORAX DX C-: CPT | Performed by: FAMILY MEDICINE

## 2024-09-24 ENCOUNTER — APPOINTMENT (OUTPATIENT)
Dept: OTHER | Facility: HOSPITAL | Age: 69
End: 2024-09-24
Attending: EMERGENCY MEDICINE

## 2024-09-27 ENCOUNTER — LAB ENCOUNTER (OUTPATIENT)
Dept: LAB | Facility: HOSPITAL | Age: 69
End: 2024-09-27
Attending: UROLOGY
Payer: MEDICARE

## 2024-09-27 DIAGNOSIS — C61 PROSTATE CANCER (HCC): ICD-10-CM

## 2024-09-27 DIAGNOSIS — R97.21 RISING PSA FOLLOWING TREATMENT FOR MALIGNANT NEOPLASM OF PROSTATE: ICD-10-CM

## 2024-09-27 LAB — PSA SERPL-MCNC: 0.04 NG/ML (ref ?–4)

## 2024-09-27 PROCEDURE — 36415 COLL VENOUS BLD VENIPUNCTURE: CPT

## 2024-09-27 PROCEDURE — 84153 ASSAY OF PSA TOTAL: CPT

## 2024-09-30 ENCOUNTER — OFFICE VISIT (OUTPATIENT)
Dept: SURGERY | Facility: CLINIC | Age: 69
End: 2024-09-30
Payer: MEDICARE

## 2024-09-30 DIAGNOSIS — C61 PROSTATE CANCER (HCC): Primary | ICD-10-CM

## 2024-09-30 PROCEDURE — 99213 OFFICE O/P EST LOW 20 MIN: CPT | Performed by: UROLOGY

## 2024-09-30 NOTE — PROGRESS NOTES
[facility]  Follow-Up Visit    HPI: Erik Brewer is a 69 year old male presents for a follow up visit. Patient was last seen on 6/27/24.    INTERVAL HISTORY: Here for follow-up on prostate cancer.  Status post robotic radical prostatectomy with pelvic lymph node dissection 12/2019.  Final pathology revealed pT3a pN0 R0 PCa.    PSA undetectable until 3/2024 where it became detectable at 0.04 ng/mL.  Since then, we have been monitoring it closely.    PSA 9/2024 slightly decreased to 0.04 ng/mL from 0.05 ng/mL in June 2024.    Reports daytime and nighttime continence. Not using any incontinence pads.     He denies fevers or chills, chest pain or shortness of breath, constipation or diarrhea or gross hematuria. Reports good urinary flow, no nocturia and no no feeling of incomplete emptying.     Patient is not sexually active however reports getting erections and masturbating.      1. Unfavorable Intermediate Risk Prostate Cancer (pT3a N0)  Patient denies family history of prostate cancer.  Elevated screening PSA level of 5.81 ng/mL in 8/2019.      Patient underwent a TRUS-guided prostate biopsy by Dr. Max on 9/30/2019 which revealed:  - Prostate volume on TRUS: 23 cc  - Prostate AdenoCa: GG 4+3=7 in 7/14 cores, up to 75% core involvement (right base, mid, and apex).  Also revealed GG 3+4=7 prostate cancer in 1/14 cores, 10% core involvement (left apex).     Patient underwent a staging multi-parametric MRI of the prostate on 11/15/2018 which revealed a PI-RADS 5 lesion throughout the right side of the peripheral zone with broad-based capsular abutment concerning for early KALEB. Also noted was an enhancing 1.5 cm lesion in the anterior column of the left acetabulum, which is highly concerning for an osseous metastasis.     Nuclear medicine bone scan to further evaluate the suspicious bone lesion demonstrated no definite evidence of osseous metastatic disease.\"  Subtle asymmetry at the junction of the left  acetabulum and pubic ramus is nonspecific\". CT of the pelvis failed to reveal the suspicious lesion and as per radiology review the lesion was deemed as almost certainly benign.    No voiding symptoms pre-op. Patient not sexually active.     - 12/30/2019: s/p RALP + PLND. Final pathology revealed GG 4+3=7 adenoCa, , bilateral, + KALEB, +PNI, - SVI, close but negative surgical margins, 0/4 LNs. pT3a N0.     - 3/2020 F/U: PSA undetectable. Patient reports being nearly completely continent. Using 1 pad daily for safety. No longer doing Kegel exercises as he feels he doesn't need them anymore.     - 9/2020 F/U: PSA undetectable. Complete recovery of urinary continence. Not sexually active.     - 3/2021 F/U: PSA undetectable. Continent. Not sexually active.     - 9/2021 F/U: PSA undetectable. Continent. Not sexually active yet reports having erections and being able to masturbate.     - 3/2022 F/U: PSA undetectable. Continent. Not sexually active yet reports having erections and being able to masturbate.     - 9/2022 F/U: PSA undetectable. Continent. Not sexually active yet reports having erections and being able to masturbate.     - 3/2023 F/U: PSA undetectable. Continent. Not sexually active yet reports having erections and being able to masturbate.     - 9/2023 F/U: PSA undetectable. Continent. Not sexually active yet reports having erections and being able to masturbate.     - 3/2024 F/U: PSA became detectable at 0.04 ng/mL.  Continent.  No pads or incontinence products.  Not sexually active yet reports having erections and being able to masturbate.    - 6/2024 F/U: PSA 0.05 ng/mL.  Continent.  No pads or incontinence products.  Not sexually active however able to have erections.    - 9/2024 follow-up: PSA 0.04 ng/mL.  Continent.  No pads or continence products.  Not sexually active however able to have erections.        PMH: HTN, HLD, thrombocytopenia, prostate cancer dx. 9/2019.     PSH: RALP + PLND (12/2019), left  hip pinning.     FAMILY Hx: No reported family history of  malignancies.  Father is  at the age of 97. Mother passed away at 104 years of age.     SOCIAL Hx: Patient is single and has no biological children.  He is not sexually active.  He is a former smoker and quit in  after smoking 0.5 packs/day for 12 years.  He drinks alcohol socially.  He works as a .    Reviewed past medical, surgical, family, and social history.  Reviewed med list and allergies.      REVIEW OF SYSTEMS:  Pertinent positives and negatives per HPI. A 10-point review of systems was performed is otherwise negative.      EXAM:  There were no vitals taken for this visit.     Physical Exam    Constitutional: He is oriented to person, place, and time. He appears well-developed. No distress.   HENT:   Head: Normocephalic.   Eyes: No scleral icterus.   Cardiovascular:  Normal rate.            Pulmonary/Chest: Effort normal.   Abdominal: Soft. He exhibits no distension. There is no abdominal tenderness.   Neurological: He is alert and oriented to person, place, and time.   Skin: Skin is warm and dry.   Psychiatric: He has a normal mood and affect. His behavior is normal.       PATHOLOGY:    Surgical Pathology                        Case: [id number]  Provider: Jairon Alvarez MD       Collected: 2019  Pathologist: Robyn Benz MD A. Prostate, bilateral seminal vesicles, ampulla of vas; radical prostatectomy:   Prostatic adenocarcinoma, [place] score 7 (4+3), grade group 3.  Tumor is seen bilaterally with predominant right side involvement.  Tumor shows extraprostatic extension with multifocal perineural invasion.  Seminal vesicles are free of carcinoma.  Tumor is seen at a distance of <1 mm from the right and left apical margins and the right posterior inked margin.  Patchy mild chronic prostatitis.  Preliminary pathologic staging pT3a, N0     B. Right pelvic lymph nodes; dissection:   Two lymph nodes,  negative for carcinoma (0/2).  Immunohistochemical stain for cytokeratin AE1/AE3 performed on blocks B1 and B2 are negative and support the above diagnosis.      C. Left pelvic lymph nodes; dissection:   Two lymph nodes, negative for carcinoma (0/2).  Immunohistochemical stain for cytokeratin AE1/AE3 performed on blocks C1 and C2 are negative and support the above diagnosis.     D. Periprostatic fat; dissection:   Fibroadipose tissue, negative for carcinoma.      LABS:    Component PSA Screen   Latest Ref Rng & Units <=4.00 ng/mL   9/2024 0.04   6/25/2024 0.05   3/20/2024 0.04   8/15/2023 <0.01   3/28/2023 <0.01   9/22/2022 <0.01   3/23/2022 <0.01   9/20/2021 <0.01   3/17/2021 <0.01   9/1/2020 <0.01   2/27/2020 <0.01   8/20/2019 5.81 (H)   4/3/2019 4.46 (H)       IMAGING:    CT Pelvis (12/11/2019):  No suspicious osseous lesions to suggest metastases.  Specifically, no radiographically visible lesion in the anterior column of the left acetabulum.  Given the lack of definitively suspicious uptake on the prior bone scan, the lesion noted on the prior MRI is almost certainly benign relating to etiologies such as a cartilaginous rest; additional benign lesions such as atypical hemangioma/fibrous dysplasia are considered less likely.  Results of this examination were discussed with the patient's physician, Dr. Jairon Alvarez, by Dr. Ugalde at 13:10 on 12/11/2019.        IMPRESSION:  69 year old male with clinically localized unfavorable intermediate risk prostate cancer diagnosed 9/2019. Status post robotic assisted laparoscopic radical prostatectomy and bilateral pelvic lymph node dissection 12/2019. Final pathology revealing pT3aN0 disease with KALEB and PNI; negative margins.     PSA levels have been undetectable up until 3/2024 when his PSA became detectable at 0.04 ng/mL.    Recent PSA 9/2024 stable at 0.04 ng/mL.    Discussed results with patient.  PSA kinetics are so far reassuring.    I recommend continued  monitoring with repeat in 6 months.  Patient nearly at the 5-year stefani since his prostate cancer treatment.  If PSA levels continue to be reassuring upon next check, then we may consider switching to annual PSA levels at that point.    His recovery following surgery has been excellent and he has completely regained urinary continence.    Discussed erectile function. Patient reports being able to achieve erections and masturbate. He is not sexually active with a partner at this point and is not interested in further assistance.    All questions answered.      PLAN:  1.  Monitor PSA levels.  Repeat in 6 months with follow-up thereafter.  If levels continue to be very low may consider switching to once yearly checks.    RTC for follow-up in 6 months with a PSA level performed prior to appointment.      Jairon Alvarez MD  9/30/2024

## 2025-02-17 ENCOUNTER — OFFICE VISIT (OUTPATIENT)
Age: 70
End: 2025-02-17
Payer: MEDICARE

## 2025-02-17 VITALS
OXYGEN SATURATION: 97 % | HEIGHT: 70 IN | WEIGHT: 186 LBS | BODY MASS INDEX: 26.63 KG/M2 | HEART RATE: 83 BPM | DIASTOLIC BLOOD PRESSURE: 74 MMHG | TEMPERATURE: 98 F | SYSTOLIC BLOOD PRESSURE: 126 MMHG

## 2025-02-17 DIAGNOSIS — Z12.11 COLON CANCER SCREENING: Primary | ICD-10-CM

## 2025-02-17 DIAGNOSIS — L65.9 ALOPECIA: ICD-10-CM

## 2025-02-17 DIAGNOSIS — E78.00 HYPERCHOLESTEREMIA: ICD-10-CM

## 2025-02-17 DIAGNOSIS — I10 ESSENTIAL HYPERTENSION: ICD-10-CM

## 2025-02-17 RX ORDER — LISINOPRIL 10 MG/1
10 TABLET ORAL DAILY
Qty: 90 TABLET | Refills: 1 | Status: SHIPPED | OUTPATIENT
Start: 2025-02-17

## 2025-02-17 RX ORDER — SIMVASTATIN 10 MG
10 TABLET ORAL NIGHTLY
Qty: 90 TABLET | Refills: 1 | Status: SHIPPED | OUTPATIENT
Start: 2025-02-17

## 2025-02-17 RX ORDER — FINASTERIDE 1 MG/1
1 TABLET, FILM COATED ORAL DAILY
Qty: 90 TABLET | Refills: 1 | Status: SHIPPED | OUTPATIENT
Start: 2025-02-17

## 2025-02-17 NOTE — PROGRESS NOTES
HPI:     Chief Complaint   Patient presents with    Follow - Up       Erik Brewer is a 69 year old male presenting for:    HTN/HLD  -Taking meds as prescribed-tolerating well without SEs.   -Denies CP, Palpitations, leg edema, SOB, LOC, HA, syncope    Alopecia-> stable with finasteride                  Results for orders placed or performed in visit on 09/27/24   PSA Diagnostic [E]    Collection Time: 09/27/24 12:46 PM   Result Value Ref Range    Total PSA  0.04 <=4.00 ng/mL       Labs:   No results found for: \"A1C\"   Lab Results   Component Value Date/Time    CHOLEST 175 03/20/2024 11:19 AM    HDL 64 (H) 03/20/2024 11:19 AM    TRIG 80 03/20/2024 11:19 AM    LDL 96 03/20/2024 11:19 AM    NONHDLC 111 03/20/2024 11:19 AM       Lab Results   Component Value Date/Time    GLU 81 03/20/2024 11:19 AM     03/20/2024 11:19 AM    K 4.7 03/20/2024 11:19 AM     03/20/2024 11:19 AM    CO2 29.0 03/20/2024 11:19 AM    CREATSERUM 0.90 03/20/2024 11:19 AM    CA 9.5 03/20/2024 11:19 AM    ALB 4.5 03/20/2024 11:19 AM    TP 7.2 03/20/2024 11:19 AM    ALKPHO 60 03/20/2024 11:19 AM    AST 25 03/20/2024 11:19 AM    ALT 26 03/20/2024 11:19 AM    BILT 1.1 03/20/2024 11:19 AM    TSH 2.850 06/14/2020 09:58 AM          Medications:  Current Outpatient Medications   Medication Sig Dispense Refill    finasteride 1 MG Oral Tab Take 1 tablet (1 mg total) by mouth daily. 90 tablet 1    lisinopril 10 MG Oral Tab Take 1 tablet (10 mg total) by mouth daily. 90 tablet 1    simvastatin 10 MG Oral Tab Take 1 tablet (10 mg total) by mouth nightly. 90 tablet 1      Past Medical History:    Cancer (HCC)    prostate    Colon adenomas    Essential hypertension    Hearing impairment    Hemorrhoids    High blood pressure    Hip fracture (HCC)    Hypercholesterolemia    Hyperlipidemia         Past Surgical History:   Procedure Laterality Date    Colonoscopy  1987 approx.    Colonoscopy  06/24/2020    Laparoscopy, surgical prostatectomy,  retropubic radical, w/nerve sparing  2019    RALP + PLND by Dr. Alvarez @ [facility] (pT3a N0)     No Known Allergies   Social History:  Social History     Socioeconomic History    Marital status: Single   Tobacco Use    Smoking status: Former     Current packs/day: 0.00     Average packs/day: 0.5 packs/day for 14.0 years (7.0 ttl pk-yrs)     Types: Cigarettes     Start date: 1971     Quit date: 1985     Years since quittin.7    Smokeless tobacco: Never   Vaping Use    Vaping status: Never Used   Substance and Sexual Activity    Alcohol use: Not Currently    Drug use: No      Family History:  Family History   Problem Relation Age of Onset    Heart Disorder Father         CFH    Hypertension Mother     Lipids Mother     Cancer Mother         Breast Cancer, 1963 approx.    Hypertension Brother           REVIEW OF SYSTEMS:   Review of Systems   Constitutional:  Negative for fatigue and fever.   Respiratory:  Negative for cough and shortness of breath.    Cardiovascular:  Negative for chest pain, palpitations and leg swelling.   Gastrointestinal:  Negative for vomiting, abdominal pain, diarrhea and constipation.   Musculoskeletal:  Negative for joint pain.   Neurological:  Negative for headaches.            PHYSICAL EXAM:   /74   Pulse 83   Temp 98.1 °F (36.7 °C)   Ht 5' 10\" (1.778 m)   Wt 186 lb (84.4 kg)   SpO2 97%   BMI 26.69 kg/m²  Estimated body mass index is 26.69 kg/m² as calculated from the following:    Height as of this encounter: 5' 10\" (1.778 m).    Weight as of this encounter: 186 lb (84.4 kg).     Wt Readings from Last 3 Encounters:   25 186 lb (84.4 kg)   08/15/24 177 lb 6.4 oz (80.5 kg)   24 188 lb (85.3 kg)       Physical Exam   Vitals reviewed.   Constitutional: He appears well-developed. No distress.   Cardiovascular:  Normal rate, regular rhythm and normal heart sounds.            No murmur heard.   Edema not present.  Pulmonary/Chest: Effort normal and breath  sounds normal. No respiratory distress.   Abdominal: Soft. Bowel sounds are normal. He exhibits no distension. There is no abdominal tenderness.   Neurological: No cranial nerve deficit.           ASSESSMENT AND PLAN:   Patient is a 69 year old male who presents primarily presents for:    Diagnoses and all orders for this visit:    Colon cancer screening  -     [facility] GI Telephone Colon Screen; Future    Alopecia  -     finasteride 1 MG Oral Tab; Take 1 tablet (1 mg total) by mouth daily.    Essential hypertension  -     lisinopril 10 MG Oral Tab; Take 1 tablet (10 mg total) by mouth daily.  -stable; controlled  -continue meds  -watch salt intake, regular exercise, DASH/heart healthy eating  -monitor home BP regularly and maintain log; if elevated reading >160/100 notify Md.    Hypercholesteremia  -     simvastatin 10 MG Oral Tab; Take 1 tablet (10 mg total) by mouth nightly.             Return in about 6 months (around 8/17/2025) for physical.  Patient indicates understanding of the above recommendations and agrees to the above plan.  Reasurrance and education provided. All questions answered.  Notified to call with any questions, complications, allergies, or worsening or changing symptoms as well as any side effects or complications from the treatments .  Red flags/ ER precautions discussed.    Spent 30 minutes including chart review, reviewing appropriate medical history, evaluating patient, discussing treatment options, counseling and education (diet and exercise), ordering appropriate diagnostic tests and completing documentation.          Meds & Refills for this Visit:  Requested Prescriptions     Signed Prescriptions Disp Refills    finasteride 1 MG Oral Tab 90 tablet 1     Sig: Take 1 tablet (1 mg total) by mouth daily.    lisinopril 10 MG Oral Tab 90 tablet 1     Sig: Take 1 tablet (10 mg total) by mouth daily.    simvastatin 10 MG Oral Tab 90 tablet 1     Sig: Take 1 tablet (10 mg total) by mouth nightly.        No orders of the defined types were placed in this encounter.      Imaging & Consults:  OP REFERRAL TO [facility] TELEPHONE COLON SCREEN    Health Maintenance:  Zoster Vaccines(2 of 3) due on 05/10/2017  Annual Depression Screen due on 12/30/2020  Fall Risk Screening due on 06/09/2021  Annual Physical due on 06/16/2021  PSA due on 09/01/2022  Colonoscopy due on 06/24/2025  Influenza Vaccine Completed  Pneumococcal Vaccine: 65+ Years Completed      Susi Welsh MD

## 2025-02-18 ENCOUNTER — TELEPHONE (OUTPATIENT)
Facility: CLINIC | Age: 70
End: 2025-02-18

## 2025-02-18 NOTE — TELEPHONE ENCOUNTER
1st attempt to schedule telephone colon screening appointment.     Left message to call back.     Plan to await call back and/or follow up.

## 2025-02-25 ENCOUNTER — OFFICE VISIT (OUTPATIENT)
Dept: DERMATOLOGY CLINIC | Facility: CLINIC | Age: 70
End: 2025-02-25
Payer: MEDICARE

## 2025-02-25 DIAGNOSIS — L82.1 SEBORRHEIC KERATOSES: Primary | ICD-10-CM

## 2025-02-25 DIAGNOSIS — R20.2 NOTALGIA PARESTHETICA: ICD-10-CM

## 2025-02-25 DIAGNOSIS — D22.9 MULTIPLE BENIGN NEVI: ICD-10-CM

## 2025-02-25 DIAGNOSIS — D18.01 CHERRY ANGIOMA: ICD-10-CM

## 2025-02-25 DIAGNOSIS — L81.4 LENTIGINES: ICD-10-CM

## 2025-02-25 DIAGNOSIS — M67.40 GANGLION CYST: ICD-10-CM

## 2025-02-25 PROCEDURE — 99203 OFFICE O/P NEW LOW 30 MIN: CPT | Performed by: STUDENT IN AN ORGANIZED HEALTH CARE EDUCATION/TRAINING PROGRAM

## 2025-02-25 NOTE — PROGRESS NOTES
New patient     Referred by:   Susi Junior MD .     CHIEF COMPLAINT: FBSE    HISTORY OF PRESENT ILLNESS: .    1. Atypical pigmented skin lesion   Location: Face/R cheek   Duration: Cheek  Bleeding, growing, changing?: No  Scaly:No    Itchy:No    Current treatment: None   Past treatments: None    2. Skin lesion  Location: L mid back/below shoulder blade   Duration: Chronic  Bleeding, growing, changing?: No  Scaly:No    Itchy:Yes  Current treatment: None  Past treatments: None    - No particular lesions of concern.       DERM HISTORY:  History of skin cancer: No  History of melanoma: No    FAMILY HISTORY:  History of melanoma: Unknown    PAST MEDICAL HISTORY:  Past Medical History:    Cancer (HCC)    prostate    Colon adenomas    Essential hypertension    Hearing impairment    Hemorrhoids    High blood pressure    Hip fracture (HCC)    Hypercholesterolemia    Hyperlipidemia       REVIEW OF SYSTEMS:  Constitutional: Denies fever, chills, unintentional weight loss.   Skin as per HPI    Medications  Current Outpatient Medications   Medication Sig Dispense Refill    finasteride 1 MG Oral Tab Take 1 tablet (1 mg total) by mouth daily. 90 tablet 1    lisinopril 10 MG Oral Tab Take 1 tablet (10 mg total) by mouth daily. 90 tablet 1    simvastatin 10 MG Oral Tab Take 1 tablet (10 mg total) by mouth nightly. 90 tablet 1       PHYSICAL EXAM:  Patient declined chaperone   General: awake, alert, no acute distress  Skin: Skin exam was performed today including the following: head and face, scalp, neck, chest (including breasts and axillae), abdomen, back, bilateral upper extremities, bilateral lower extremities, hands, feet, digits, nails. Pertinent findings include:   - Scattered bright red-purple dome-shaped papules on the trunk and extremities   - Scattered light brown stellate macules on sun exposed sites  - Scattered, evenly colored, round brown macules and papules with regular borders on the trunk and  extremities  - Numerous scattered skin-colored and brown, waxy, stuck-on papules and plaques on the trunk and extremities  - R knee with a soft mobil nodule     ASSESSMENT & PLAN:  Pathophysiology of diagnoses discussed with patient.  Therapeutic options reviewed. Risks, benefits, and alternatives discussed with patient. Instructions reviewed at length.    #Lentigines  #Seborrheic keratoses   #Cherry angiomas   - Reassurance provided regarding the benign nature of these lesions.    #Multiple benign nevi  - Complete skin exam performed today with no outlier lesions identified   - Reassured patient of benign nature of these lesions.   - Recommend daily photoprotection with broad-spectrum sunscreen, avoidance of sun during peak hours, and sun protective clothing.    - Dermoscopy was used for physical examination of pigmented lesions during today's office visit.    #Notalgia paresthetica  - Reassured regarding benign nature. No treatment necessary unless symptomatic/changing.    #Ganglion Cyst- R knee  - Reassured regarding benign nature. No treatment necessary unless symptomatic/changing.  - Referred to Orthopedic surgery for removal      Return to clinic: 2-3 years  or sooner if something concerning arises     Mil Cunningham MD    By signing my name below, Minda SERRANO MA,  attest that this documentation has been prepared under the direction and in the presence of Mil Cunningham MD.   Electronically Signed: Minda VOGT MA, 2/25/2025, 9:05 AM.    IMil MD,  personally performed the services described in this documentation. All medical record entries made by the scribe were at my direction and in my presence.  I have reviewed the chart and agree that the record reflects my personal performance and is accurate and complete.  Mil Cunningham MD, 2/25/2025, 9:28 AM

## 2025-02-27 NOTE — TELEPHONE ENCOUNTER
Date of procedure: 06/24/20     Procedure: Colonoscopy w/ biopsy and snare polypectomy     Pre-operative diagnosis: colorectal cancer screening     Post-operative diagnosis: colon polyps, diverticulosis, hemorrhoids     Sedation: monitored anesthesia care (MAC)     Consent: We discussed the risks/benefits and alternatives to this procedure, as well as the planned sedation. Informed consent was obtained from the patient after the risks of the procedure were discussed, including but not limited to bleeding, perforation, aspiration, infection, or possibility of a missed lesion as well as the risks of anesthesia including but not limited to cardiopulmonary complications. The patient signed informed consent and elected to proceed with Colonoscopy with intervention [i.e. Biopsy, control of bleeding, dilatation, polypectomy, endoscopic mucosal resection, etc.] as indicated.     Colonoscopy procedure: Once an adequate level of sedation was obtained a digital rectal exam was completed revealing normal tone and no masses palpated. Then the lubricated tip of the [id number] diagnostic video colonoscope was carefully inserted and advanced without difficulty to the cecum using the air insufflation technique (only Co2 was used for insufflation). The cecum was identified by localizing the trifold, the appendix and the ileocecal valve. Withdrawal was begun with thorough washing and careful examination of the colonic walls and folds. The ascending colon was viewed twice in the forward view. Photodocumentation was obtained. The bowel prep was good. Views of the colon were good with washing. Withdrawal time was 18 minutes.     Air was then withdrawn and the endoscope was removed. The patient tolerated the procedure well. There were no immediate postoperative complications. The patient’s vital signs were monitored throughout the procedure and remained stable.     Estimated blood loss: insignificant     Specimens collected:  Colon  polyps     Complications: none      Colonoscopy findings:  Terminal ileum: normal mucosa     Cecum: normal mucosa and vascular pattern     Ascending colon: there was a 2 mm proximal ascending colon polyp removed by cold biopsy forceps. Otherwise, normal mucosa and vascular pattern     Transverse colon: there was a 4 mm polyp removed by cold snare polypectomy. Otherwise, normal mucosa and vascular pattern     Descending colon: normal mucosa and vascular pattern     Sigmoid colon: there was mild diverticulosis. There was as 5 mm sessile polyp removed by cold snare polypectomy. Otherwise, normal mucosa and vascular pattern     Rectum: retroflexed view showed small to medium sized non-bleeding hemorrhoids. Otherwise, normal mucosa and vascular pattern.     Impression:  1. 3 small colon polyps removed  2. Mild sigmoid colon diverticulosis  3. Small to medium sized non-bleeding hemorrhoids  4. Otherwise, unremarkable ileocolonoscopy     Recommend:  1. Await pathology.   2. Repeat colonoscopy interval pending final pathology results. If new signs or symptoms develop, procedure may need to be repeated sooner.   3. Continue your current medications  4. Increase fiber in the diet  5. Follow up with your primary care physician on a routine basis     >>>If biopsies were performed and you have not received your pathology results either by phone or letter within 2 weeks, please call our office at [phone].     Phu Chen MD  [facility] - Gastroenterology  6/24/2020        =============================================================================================  Final Diagnosis:      A. Ascending colon polyp:   Tubular adenoma.     B. Transverse colon polyp:   Fragments of colonic mucosa with focal hyperplastic change.     C. Sigmoid colon polyp:   Tubular adenoma.

## 2025-02-27 NOTE — TELEPHONE ENCOUNTER
Left message to call back.     Plan to await call back and/or follow up.     =========================================    Colon 5 year recall. Operative and pathology report attached below.     Reviewed allergies pharmacy, medications, medical/surgical history on file, and GI symptoms.     Please provide orders if ok to schedule directly.     Last Procedure, Date, MD: Colonoscopy 6/24/20   Last Diagnosis: Colon polyps, diverticulosis, hemorrhoids    Recalled (mth/yrs): 5 years   Sedation Used Previously: MAC    Last Prep Used (if known):    Quality Of Prep (if known): Good    Anticoagulants:  Diuretics:   Diabetic Medication (Includes Insulin):   Weight loss Medication:   Iron/Herbal/Multivitamin Supplements:  Marijuana/Vaping/CBD:  Height/Weight:  BMI:  Hx of Cardiac &/or CVA Issues (MI/Stroke):  If yes, in the last 12 months?   Devices Pacemaker/Defibrillator/Stent(s):  Respiratory Issues/Oxygen Use/CECILIA/COPD:  CiPAP/BiPAP:   Issues w/ Anesthesia:    Symptoms (Y/N):   Symptoms Details:     Special Comments/Notes:    Thank you!

## 2025-03-11 ENCOUNTER — TELEPHONE (OUTPATIENT)
Age: 70
End: 2025-03-11

## 2025-03-11 NOTE — TELEPHONE ENCOUNTER
Lvm to pt is due for awv in 08/2025 to call [facility] to schedule appointment with dr. Dubon.

## 2025-03-29 ENCOUNTER — LAB ENCOUNTER (OUTPATIENT)
Dept: LAB | Age: 70
End: 2025-03-29
Attending: FAMILY MEDICINE
Payer: MEDICARE

## 2025-03-29 DIAGNOSIS — E78.00 HYPERCHOLESTEREMIA: ICD-10-CM

## 2025-03-29 DIAGNOSIS — I10 ESSENTIAL HYPERTENSION: ICD-10-CM

## 2025-03-29 DIAGNOSIS — C61 PROSTATE CANCER (HCC): ICD-10-CM

## 2025-03-29 DIAGNOSIS — D64.9 MILD CHRONIC ANEMIA: ICD-10-CM

## 2025-03-29 DIAGNOSIS — Z00.00 ENCOUNTER FOR ANNUAL HEALTH EXAMINATION: ICD-10-CM

## 2025-03-29 DIAGNOSIS — D69.6 THROMBOCYTOPENIA: ICD-10-CM

## 2025-03-29 LAB
ALBUMIN SERPL-MCNC: 4.6 G/DL (ref 3.2–4.8)
ALBUMIN/GLOB SERPL: 1.6 {RATIO} (ref 1–2)
ALP LIVER SERPL-CCNC: 62 U/L
ALT SERPL-CCNC: 25 U/L
ANION GAP SERPL CALC-SCNC: 9 MMOL/L (ref 0–18)
AST SERPL-CCNC: 21 U/L (ref ?–34)
BASOPHILS # BLD AUTO: 0.04 X10(3) UL (ref 0–0.2)
BASOPHILS NFR BLD AUTO: 1 %
BILIRUB SERPL-MCNC: 1 MG/DL (ref 0.2–1.1)
BILIRUB UR QL: NEGATIVE
BUN BLD-MCNC: 17 MG/DL (ref 9–23)
BUN/CREAT SERPL: 16 (ref 10–20)
CALCIUM BLD-MCNC: 9.2 MG/DL (ref 8.7–10.4)
CHLORIDE SERPL-SCNC: 108 MMOL/L (ref 98–112)
CHOLEST SERPL-MCNC: 163 MG/DL (ref ?–200)
CLARITY UR: CLEAR
CO2 SERPL-SCNC: 25 MMOL/L (ref 21–32)
COLOR UR: YELLOW
CREAT BLD-MCNC: 1.06 MG/DL
DEPRECATED RDW RBC AUTO: 45.2 FL (ref 35.1–46.3)
EGFRCR SERPLBLD CKD-EPI 2021: 75 ML/MIN/1.73M2 (ref 60–?)
EOSINOPHIL # BLD AUTO: 0.16 X10(3) UL (ref 0–0.7)
EOSINOPHIL NFR BLD AUTO: 3.9 %
ERYTHROCYTE [DISTWIDTH] IN BLOOD BY AUTOMATED COUNT: 12.4 % (ref 11–15)
EST. AVERAGE GLUCOSE BLD GHB EST-MCNC: 103 MG/DL (ref 68–126)
FASTING PATIENT LIPID ANSWER: YES
FASTING STATUS PATIENT QL REPORTED: YES
GLOBULIN PLAS-MCNC: 2.9 G/DL (ref 2–3.5)
GLUCOSE BLD-MCNC: 90 MG/DL (ref 70–99)
GLUCOSE UR-MCNC: NORMAL MG/DL
HBA1C MFR BLD: 5.2 % (ref ?–5.7)
HCT VFR BLD AUTO: 39.5 %
HDLC SERPL-MCNC: 57 MG/DL (ref 40–59)
HGB BLD-MCNC: 13.1 G/DL
HGB UR QL STRIP.AUTO: NEGATIVE
IMM GRANULOCYTES # BLD AUTO: 0.01 X10(3) UL (ref 0–1)
IMM GRANULOCYTES NFR BLD: 0.2 %
KETONES UR-MCNC: NEGATIVE MG/DL
LDLC SERPL CALC-MCNC: 91 MG/DL (ref ?–100)
LEUKOCYTE ESTERASE UR QL STRIP.AUTO: NEGATIVE
LYMPHOCYTES # BLD AUTO: 1.17 X10(3) UL (ref 1–4)
LYMPHOCYTES NFR BLD AUTO: 28.3 %
MCH RBC QN AUTO: 32.8 PG (ref 26–34)
MCHC RBC AUTO-ENTMCNC: 33.2 G/DL (ref 31–37)
MCV RBC AUTO: 98.8 FL
MONOCYTES # BLD AUTO: 0.41 X10(3) UL (ref 0.1–1)
MONOCYTES NFR BLD AUTO: 9.9 %
NEUTROPHILS # BLD AUTO: 2.35 X10 (3) UL (ref 1.5–7.7)
NEUTROPHILS # BLD AUTO: 2.35 X10(3) UL (ref 1.5–7.7)
NEUTROPHILS NFR BLD AUTO: 56.7 %
NITRITE UR QL STRIP.AUTO: NEGATIVE
NONHDLC SERPL-MCNC: 106 MG/DL (ref ?–130)
OSMOLALITY SERPL CALC.SUM OF ELEC: 295 MOSM/KG (ref 275–295)
PH UR: 5.5 [PH] (ref 5–8)
PLATELET # BLD AUTO: 165 10(3)UL (ref 150–450)
POTASSIUM SERPL-SCNC: 4.3 MMOL/L (ref 3.5–5.1)
PROT SERPL-MCNC: 7.5 G/DL (ref 5.7–8.2)
PROT UR-MCNC: NEGATIVE MG/DL
PSA SERPL-MCNC: <0.04 NG/ML (ref ?–4)
RBC # BLD AUTO: 4 X10(6)UL
SODIUM SERPL-SCNC: 142 MMOL/L (ref 136–145)
SP GR UR STRIP: 1.02 (ref 1–1.03)
TRIGL SERPL-MCNC: 82 MG/DL (ref 30–149)
TSI SER-ACNC: 2.6 UIU/ML (ref 0.55–4.78)
UROBILINOGEN UR STRIP-ACNC: NORMAL
VLDLC SERPL CALC-MCNC: 13 MG/DL (ref 0–30)
WBC # BLD AUTO: 4.1 X10(3) UL (ref 4–11)

## 2025-03-29 PROCEDURE — 83036 HEMOGLOBIN GLYCOSYLATED A1C: CPT

## 2025-03-29 PROCEDURE — 80053 COMPREHEN METABOLIC PANEL: CPT

## 2025-03-29 PROCEDURE — 80061 LIPID PANEL: CPT

## 2025-03-29 PROCEDURE — 81003 URINALYSIS AUTO W/O SCOPE: CPT

## 2025-03-29 PROCEDURE — 84443 ASSAY THYROID STIM HORMONE: CPT

## 2025-03-29 PROCEDURE — 36415 COLL VENOUS BLD VENIPUNCTURE: CPT

## 2025-03-29 PROCEDURE — 84153 ASSAY OF PSA TOTAL: CPT

## 2025-03-29 PROCEDURE — 85025 COMPLETE CBC W/AUTO DIFF WBC: CPT

## 2025-03-31 ENCOUNTER — TELEPHONE (OUTPATIENT)
Dept: SURGERY | Facility: CLINIC | Age: 70
End: 2025-03-31

## 2025-03-31 NOTE — TELEPHONE ENCOUNTER
----- Message from Jairon Alvarez sent at 3/29/2025  6:43 PM CDT -----  Probably scheduled with ELINOR by mistake. Should be on my schedule. PSA looks good.

## 2025-04-04 ENCOUNTER — TELEPHONE (OUTPATIENT)
Facility: CLINIC | Age: 70
End: 2025-04-04

## 2025-04-04 NOTE — TELEPHONE ENCOUNTER
Colonoscopy Report           Erki Brewer      1955 Age 65 year old   PCP Susi Welsh MD Endoscopist Phu Chen MD      Date of procedure: 20     Procedure: Colonoscopy w/ biopsy and snare polypectomy     Pre-operative diagnosis: colorectal cancer screening     Post-operative diagnosis: colon polyps, diverticulosis, hemorrhoids     Sedation: monitored anesthesia care (MAC)     Consent: We discussed the risks/benefits and alternatives to this procedure, as well as the planned sedation. Informed consent was obtained from the patient after the risks of the procedure were discussed, including but not limited to bleeding, perforation, aspiration, infection, or possibility of a missed lesion as well as the risks of anesthesia including but not limited to cardiopulmonary complications. The patient signed informed consent and elected to proceed with Colonoscopy with intervention [i.e. Biopsy, control of bleeding, dilatation, polypectomy, endoscopic mucosal resection, etc.] as indicated.     Colonoscopy procedure: Once an adequate level of sedation was obtained a digital rectal exam was completed revealing normal tone and no masses palpated. Then the lubricated tip of the [id number] diagnostic video colonoscope was carefully inserted and advanced without difficulty to the cecum using the air insufflation technique (only Co2 was used for insufflation). The cecum was identified by localizing the trifold, the appendix and the ileocecal valve. Withdrawal was begun with thorough washing and careful examination of the colonic walls and folds. The ascending colon was viewed twice in the forward view. Photodocumentation was obtained. The bowel prep was good. Views of the colon were good with washing. Withdrawal time was 18 minutes.     Air was then withdrawn and the endoscope was removed. The patient tolerated the procedure well. There were no immediate postoperative complications. The  patient’s vital signs were monitored throughout the procedure and remained stable.     Estimated blood loss: insignificant     Specimens collected:  Colon polyps     Complications: none      Colonoscopy findings:  Terminal ileum: normal mucosa     Cecum: normal mucosa and vascular pattern     Ascending colon: there was a 2 mm proximal ascending colon polyp removed by cold biopsy forceps. Otherwise, normal mucosa and vascular pattern     Transverse colon: there was a 4 mm polyp removed by cold snare polypectomy. Otherwise, normal mucosa and vascular pattern     Descending colon: normal mucosa and vascular pattern     Sigmoid colon: there was mild diverticulosis. There was as 5 mm sessile polyp removed by cold snare polypectomy. Otherwise, normal mucosa and vascular pattern     Rectum: retroflexed view showed small to medium sized non-bleeding hemorrhoids. Otherwise, normal mucosa and vascular pattern.     Impression:  1. 3 small colon polyps removed  2. Mild sigmoid colon diverticulosis  3. Small to medium sized non-bleeding hemorrhoids  4. Otherwise, unremarkable ileocolonoscopy     Recommend:  1. Await pathology.   2. Repeat colonoscopy interval pending final pathology results. If new signs or symptoms develop, procedure may need to be repeated sooner.   3. Continue your current medications  4. Increase fiber in the diet  5. Follow up with your primary care physician on a routine basis     >>>If biopsies were performed and you have not received your pathology results either by phone or letter within 2 weeks, please call our office at [phone].     Phu Chen MD  [facility] - Gastroenterology  6/24/2020                        Electronically signed by Phu Chen MD at 6/24/2020 10:16 AM  Final Diagnosis:      A. Ascending colon polyp:   Tubular adenoma.     B. Transverse colon polyp:   Fragments of colonic mucosa with focal hyperplastic change.     C. Sigmoid colon polyp:   Tubular adenoma.

## 2025-04-04 NOTE — TELEPHONE ENCOUNTER
Left message to call back.    Colon recall.     Please provide orders if ok to schedule directly.     Reviewed allergies, pharmacy, medications and health history.     Last Procedure, Date, MD: 6/24/20, Colonoscopy, Dr. Chen  Last Diagnosis: Colon polyps, diverticulosis, hemorrhoids   Recalled (mth/yrs): 5 years  Sedation Used Previously: MAC    Last Prep Used (if known):    Quality Of Prep (if known): Good  Anticoagulants:   Diabetic Medication (oral/insulin):   Weight loss Medication:   Iron/Herbal/Multivitamin Supplements:   Marijuana/Vaping/CBD:  Height/Weight:  BMI:  Hx of Cardiac &/or CVA Issues (MI/Stroke):  If yes, in the last 12 months?   Devices Pacemaker/Defibrillator/Stents:  Respiratory Issues/Oxygen Use/CECILIA/COPD:  CiPAP/BiPAP:   Issues w/ Anesthesia:    Symptoms (Y/N):   Symptoms Details:     Special Comments/Notes:    Thank you!

## 2025-05-29 ENCOUNTER — TELEPHONE (OUTPATIENT)
Facility: CLINIC | Age: 70
End: 2025-05-29

## 2025-08-08 ENCOUNTER — LAB ENCOUNTER (OUTPATIENT)
Dept: LAB | Age: 70
End: 2025-08-08
Attending: FAMILY MEDICINE

## 2025-08-08 ENCOUNTER — OFFICE VISIT (OUTPATIENT)
Age: 70
End: 2025-08-08

## 2025-08-08 VITALS
WEIGHT: 184 LBS | HEART RATE: 93 BPM | SYSTOLIC BLOOD PRESSURE: 140 MMHG | OXYGEN SATURATION: 98 % | HEIGHT: 70 IN | TEMPERATURE: 97 F | BODY MASS INDEX: 26.34 KG/M2 | RESPIRATION RATE: 18 BRPM | DIASTOLIC BLOOD PRESSURE: 70 MMHG

## 2025-08-08 DIAGNOSIS — Z00.00 ENCOUNTER FOR ANNUAL HEALTH EXAMINATION: Primary | ICD-10-CM

## 2025-08-08 DIAGNOSIS — R00.1 BRADYCARDIA: ICD-10-CM

## 2025-08-08 DIAGNOSIS — E78.00 HYPERCHOLESTEREMIA: ICD-10-CM

## 2025-08-08 DIAGNOSIS — I10 ESSENTIAL HYPERTENSION: ICD-10-CM

## 2025-08-08 DIAGNOSIS — Z13.6 SCREENING FOR CARDIOVASCULAR CONDITION: ICD-10-CM

## 2025-08-08 DIAGNOSIS — D69.6 THROMBOCYTOPENIA: ICD-10-CM

## 2025-08-08 DIAGNOSIS — Z85.46 HISTORY OF PROSTATE CANCER: ICD-10-CM

## 2025-08-08 DIAGNOSIS — L65.9 ALOPECIA: ICD-10-CM

## 2025-08-08 DIAGNOSIS — D64.9 MILD CHRONIC ANEMIA: ICD-10-CM

## 2025-08-08 DIAGNOSIS — H91.90 HEARING LOSS, UNSPECIFIED HEARING LOSS TYPE, UNSPECIFIED LATERALITY: ICD-10-CM

## 2025-08-08 LAB
ATRIAL RATE: 57 BPM
P AXIS: 52 DEGREES
P-R INTERVAL: 162 MS
Q-T INTERVAL: 422 MS
QRS DURATION: 94 MS
QTC CALCULATION (BEZET): 410 MS
R AXIS: 4 DEGREES
T AXIS: 35 DEGREES
VENTRICULAR RATE: 57 BPM

## 2025-08-08 PROCEDURE — 93005 ELECTROCARDIOGRAM TRACING: CPT

## 2025-08-08 PROCEDURE — 93010 ELECTROCARDIOGRAM REPORT: CPT | Performed by: STUDENT IN AN ORGANIZED HEALTH CARE EDUCATION/TRAINING PROGRAM

## 2025-08-08 RX ORDER — FINASTERIDE 1 MG/1
1 TABLET, FILM COATED ORAL DAILY
Qty: 90 TABLET | Refills: 3 | Status: SHIPPED | OUTPATIENT
Start: 2025-08-08

## 2025-08-08 RX ORDER — SIMVASTATIN 10 MG
10 TABLET ORAL NIGHTLY
Qty: 90 TABLET | Refills: 3 | Status: SHIPPED | OUTPATIENT
Start: 2025-08-08

## 2025-08-08 RX ORDER — LISINOPRIL 10 MG/1
10 TABLET ORAL DAILY
Qty: 90 TABLET | Refills: 3 | Status: SHIPPED | OUTPATIENT
Start: 2025-08-08

## 2025-08-18 ENCOUNTER — TELEPHONE (OUTPATIENT)
Facility: CLINIC | Age: 70
End: 2025-08-18

## 2025-08-26 PROBLEM — Z86.0101 HISTORY OF ADENOMATOUS POLYP OF COLON: Status: ACTIVE | Noted: 2025-08-26

## 2025-08-26 PROCEDURE — 88305 TISSUE EXAM BY PATHOLOGIST: CPT | Performed by: INTERNAL MEDICINE

## (undated) DEVICE — STERILE TETRA-FLEX CF, ELASTIC BANDAGE, 4" X 5.5YD: Brand: TETRA-FLEX™CF

## (undated) DEVICE — SUTURE VICRYL 0 CT-1

## (undated) DEVICE — 2HRM17 2-0 UMND 16X16 13MM LDR: Brand: 2HRM17 2-0 UMND 16X16 13MM LDR

## (undated) DEVICE — GAMMEX® PI HYBRID SIZE 8, STERILE POWDER-FREE SURGICAL GLOVE, POLYISOPRENE AND NEOPRENE BLEND: Brand: GAMMEX

## (undated) DEVICE — DRAPE SRG 26X15IN UTL TPE STRL

## (undated) DEVICE — SOL  .9 1000ML BTL

## (undated) DEVICE — TROCAR: Brand: KII FIOS FIRST ENTRY

## (undated) DEVICE — DEV CLOS VLOC 2/0 V 9 V-30

## (undated) DEVICE — DALE FOLEY CATHETER HOLDER, LEGBAND, FITS UP TO 30": Brand: DALE FOLEY CATHETER HOLDER

## (undated) DEVICE — SUTURE VICRYL 0 CP-1

## (undated) DEVICE — ARM DRAPE

## (undated) DEVICE — PROXIMATE SKIN STAPLERS (35 WIDE) CONTAINS 35 STAINLESS STEEL STAPLES (FIXED HEAD): Brand: PROXIMATE

## (undated) DEVICE — CLIP HEMOLOK LARGE PURPLE

## (undated) DEVICE — GAUZE PACKING PLAIN 1

## (undated) DEVICE — ROBOTIC: Brand: MEDLINE INDUSTRIES, INC.

## (undated) DEVICE — PLASTC TOOMEY SYRNG DISP

## (undated) DEVICE — SUTURE VICRYL 3-0 SH

## (undated) DEVICE — NON-ADHERENT PAD PREPACK: Brand: TELFA

## (undated) DEVICE — KIT DRN 1/8IN PVC 3 SPRG EVAC

## (undated) DEVICE — SUTURE VICRYL 2-0 FS-1

## (undated) DEVICE — DRAPE SHEET LAPCHOLE 124X100X7

## (undated) DEVICE — PEN 6" SURGICAL MARKING PURPL

## (undated) DEVICE — PROGRASP FORCEPS: Brand: ENDOWRIST

## (undated) DEVICE — TRAY SKIN PREP PVP-1

## (undated) DEVICE — INSUFFLATION NEEDLE TO ESTABLISH PNEUMOPERITONEUM.: Brand: INSUFFLATION NEEDLE

## (undated) DEVICE — GAMMEX® PI HYBRID SIZE 7.5, STERILE POWDER-FREE SURGICAL GLOVE, POLYISOPRENE AND NEOPRENE BLEND: Brand: GAMMEX

## (undated) DEVICE — Device

## (undated) DEVICE — VISUALIZATION SYSTEM: Brand: CLEARIFY

## (undated) DEVICE — MARYLAND BIPOLAR FORCEPS: Brand: ENDOWRIST

## (undated) DEVICE — ELECTRO LUBE IS A SINGLE PATIENT USE DEVICE THAT IS INTENDED TO BE USED ON ELECTROSURGICAL ELECTRODES TO REDUCE STICKING.: Brand: KEY SURGICAL ELECTRO LUBE

## (undated) DEVICE — DRESSING BIOPATCH 1X4 CNTR

## (undated) DEVICE — STAPLER INTNL 26CMX4MM X

## (undated) DEVICE — RELOAD STPLR 45MM EGIA 3-STPL

## (undated) DEVICE — TISSUE RETRIEVAL SYSTEM: Brand: INZII RETRIEVAL SYSTEM

## (undated) DEVICE — SUTURE VICRYL 0

## (undated) DEVICE — CANNULA SEAL

## (undated) DEVICE — SOL  .9 1000ML BAG

## (undated) DEVICE — SUTURE VICRYL 0 UR-6

## (undated) DEVICE — TETRA-FLEX CF WOVEN LATEX FREE ELASTIC BANDAGE 6" X 5.5 YD: Brand: TETRA-FLEX™CF

## (undated) DEVICE — 3M™ MEDITPORE™ SOFT CLOTH TAPE 6 IN X 10 YD 12 ROLLS/CASE 2966: Brand: 3M™ MEDIPORE™

## (undated) DEVICE — ENCORE® LATEX MICRO SIZE 6.5, STERILE LATEX POWDER-FREE SURGICAL GLOVE: Brand: ENCORE

## (undated) DEVICE — LEGGINGS SRG 48X31IN CUF STRL

## (undated) DEVICE — DERMABOND LIQUID ADHESIVE

## (undated) DEVICE — WEBRIL COTTON UNDERCAST PADDING: Brand: WEBRIL

## (undated) DEVICE — SUTURE VICRYL 0 J340H

## (undated) DEVICE — ENCORE® LATEX MICRO SIZE 8, STERILE LATEX POWDER-FREE SURGICAL GLOVE: Brand: ENCORE

## (undated) DEVICE — TIP COVER ACCESSORY

## (undated) DEVICE — APPLICATOR COTTON TIP 6\" 2/PK

## (undated) DEVICE — DRAIN RESERVOIR RELIAVAC 100CC

## (undated) DEVICE — STERILE SURGICAL LUBRICANT, METAL TUBE: Brand: SURGILUBE

## (undated) DEVICE — HIP PINNING: Brand: MEDLINE INDUSTRIES, INC.

## (undated) DEVICE — COVER SGL STRL LGHT HNDL BLU

## (undated) DEVICE — SUTURE MONOCRYL 4-0 PS-2

## (undated) DEVICE — BAG DRAIN INFECTION CNTRL 2000

## (undated) DEVICE — SUTURE SILK 2-0 FS

## (undated) DEVICE — 2.8MM X 5" QUICK RELEASE DRILL: Brand: ACUMED

## (undated) DEVICE — LARGE NEEDLE DRIVER: Brand: ENDOWRIST

## (undated) DEVICE — SOL H2O 1000ML BTL

## (undated) DEVICE — MONOPOLAR CURVED SCISSORS: Brand: ENDOWRIST

## (undated) DEVICE — ENCORE® LATEX ACCLAIM SIZE 8, STERILE LATEX POWDER-FREE SURGICAL GLOVE: Brand: ENCORE

## (undated) DEVICE — OCCLUSIVE GAUZE STRIP,3% BISMUTH TRIBROMOPHENATE IN PETROLATUM BLEND: Brand: XEROFORM

## (undated) DEVICE — TOWEL SURG OR 17X30IN BLUE

## (undated) DEVICE — ABSORBABLE HEMOSTAT (OXIDIZED REGENERATED CELLULOSE, U.S.P.): Brand: SURGICEL

## (undated) DEVICE — SUCTION CANISTER, 3000CC,SAFELINER: Brand: DEROYAL

## (undated) DEVICE — COLUMN DRAPE

## (undated) DEVICE — DRESSING AQUACEL AG 3.5 X 6

## (undated) DEVICE — 3M™ TEGADERM™ TRANSPARENT FILM DRESSING, 1626W, 4 IN X 4-3/4 IN (10 CM X 12 CM), 50 EACH/CARTON, 4 CARTON/CASE: Brand: 3M™ TEGADERM™

## (undated) DEVICE — BLAKE SILICONE DRAIN, 15 FR ROUND, HUBLESS: Brand: BLAKE

## (undated) DEVICE — BLADELESS OBTURATOR: Brand: WECK VISTA

## (undated) NOTE — Clinical Note
Neena Torres Begin, This patient did not receive any radiation treatment, is it OK from urology aspect to proceed with CLN in the next 1-2 months? Sincerely,Reena Jordan PA-C

## (undated) NOTE — LETTER
No referring provider defined for this encounter. 06/28/19        Patient: Mika Ba   YOB: 1955   Date of Visit: 6/28/2019       Dear  Dr. Porsha Weiner MD,      Thank you for referring Mika Ba to my practice. the benefits, risks, and alternatives to this treatment option and I answered patient's questions; patient decides to observe this problem. RECOMMENDATIONS AND TREATMENT PLAN      1. Visit in August 2019.   Please get blood draw for PSA--total and dk

## (undated) NOTE — LETTER
Dr. Milagro Conde MD     [facility], [address]  [address] [facility] [phone]     August 5, 2019      Lainey Nagy  [address] [place] [address]      Dear Mr. Kam Doe

## (undated) NOTE — Clinical Note
Can you please let him know that urology is ok with using finasteride for the hair loss.  It was sent to pharmacy

## (undated) NOTE — LETTER
[address] [place] [address]      Authorization for Surgical Operation and Procedure     Date:___________                                                                                                         Time:__________  1. I hereby authorize Dr Jennifer Schaffer, my physician and his/her assistants (if applicable), which may include medical students, residents, and/or fellows, to perform the following surgical operation/ procedure and administer such anesthesia as may be determined necessary by my physician:  left femoral neck percutanous screw fixation  on Erik Brewer   2. I recognize that during the surgical operation/procedure, unforeseen conditions may necessitate additional or different procedures than those listed above. I, therefore, further authorize and request that the above-named surgeon, assistants, or designees perform such procedures as are, in their judgment, necessary and desirable. 3.   My surgeon/physician has discussed prior to my surgery the potential benefits, risks and side effects of this procedure; the likelihood of achieving goals; and potential problems that might occur during recuperation. They also discussed reasonable alternatives to the procedure, including risks, benefits, and side effects related to the alternatives and risks related to not receiving this procedure. I have had all my questions answered and I acknowledge that no guarantee has been made as to the result that may be obtained. 4.   Should the need arise during my operation or immediate post-operative period, I also consent to the administration of blood and/or blood products. Further, I understand that despite careful testing and screening of blood or blood products by collecting agencies, I may still be subject to ill effects as a result of receiving a blood transfusion and/or blood products.   The following are some, but not all, of the potential risks that can occur: fever and allergic reactions, hemolytic reactions, transmission of diseases such as Hepatitis, AIDS and Cytomegalovirus (CMV) and fluid overload. In the event that I wish to have an autologous transfusion of my own blood, or a directed donor transfusion. I will discuss this with my physician. 5.   I authorize the use of any specimen, organs, tissues, body parts or foreign objects that may be removed from my body during the operation/procedure for diagnosis, research or teaching purposes and their subsequent disposal by hospital authorities. I also authorize the release of specimen test results and/or written reports to my treating physician on the hospital medical staff or other referring or consulting physicians involved in my care, at the discretion of the Pathologist or my treating physician. 6.   I consent to the photographing or videotaping of the operations or procedures to be performed, including appropriate portions of my body for medical, scientific, or educational purposes, provided my identity is not revealed by the pictures or by descriptive texts accompanying them. If the procedure has been photographed/videotaped, the surgeon will obtain the original picture, image, videotape or CD. The hospital will not be responsible for storage, release or maintenance of the picture, image, tape or CD.    7.   I consent to the presence of a  or observers in the operating room as deemed necessary by my physician or their designees. 8.   I recognize that in the event my procedure results in extended X-Ray/fluoroscopy time, I may develop a skin reaction. 9. If I have a Do Not Attempt Resuscitation (DNAR) order in place, that status will be suspended while in the operating room, procedural suite, and during the recovery period unless otherwise explicitly stated by me (or a person authorized to consent on my behalf).  The surgeon or my attending physician will determine when the applicable recovery period ends for purposes of reinstating the DNAR order. 10. Patients having a sterilization procedure: I understand that if the procedure is successful the results will be permanent and it will therefore be impossible for me to inseminate, conceive, or bear children. I also understand that the procedure is intended to result in sterility, although the result has not been guaranteed. 11. I acknowledge that my physician has explained sedation/analgesia administration to me including the risk and benefits I consent to the administration of sedation/analgesia as may be necessary or desirable in the judgment of my physician. I CERTIFY THAT I HAVE READ AND FULLY UNDERSTAND THE ABOVE CONSENT TO OPERATION and/or OTHER PROCEDURE.    _________________________________________  __________________________________  Signature of Patient     Signature of Responsible Person         ___________________________________         Printed Name of Responsible Person           _________________________________                  Relationship to Patient  _________________________________________  ______________________________  Signature of Witness          Date  Time    STATEMENT OF PHYSICIAN My signature below affirms that prior to the time of the procedure; I have explained to the patient and/or his/her legal representative, the risks and benefits involved in the proposed treatment and any reasonable alternative to the proposed treatment. I have also explained the risks and benefits involved in refusal of the proposed treatment and alternatives to the proposed treatment and have answered the patient's questions. If I have a significant financial interest in a co-management agreement or a significant financial interest in any product or implant, or other significant relationship used in this procedure/surgery, I have disclosed this and had a discussion with my patient. _______________________________________________________________ _____________________________  Farhat Fat of Physician)                                                                                         (Date)                                   (Time)        Patient Name: Krishna Benavides    : 1955   Printed: 2021      Medical Record #: [id number]                                              Page 1

## (undated) NOTE — LETTER
Hospital Discharge Documentation  Please phone to schedule a hospital follow up appointment.     From: [address]  Phone: [phone]    Patient discharged time/date: 12/31/2019  5:18 PM  Patient discharge disposition:  Home or Perla Abd:   +bs, soft, tender as expected after surgery, wounds c/d/i, ND  LE:  No c/c/e  Neuro:  nonfocal      Reason for Admission:    Clinically localized unfavorable intermediate risk prostate cancer.      Present Illness: 75year old male who was recently Commonly known as:  SENOKOT S      Take 2 tablets by mouth nightly as needed for constipation.    Stop taking on:  January 14, 2020  Quantity:  28 tablet  Refills:  0        CONTINUE taking these medications      Instructions Prescription details   lisinopr

## (undated) NOTE — LETTER
[facility]  Administration of Anesthesia  1. I, Louise Key, or _________________________________ acting on his behalf, (Patient) (Dependent/Representative) request to receive anesthesia for my pending procedure/operation/treatment. A physician (anesthesiologist) alone or an anesthesiologist working with a nurse anesthetist may administer my anesthesia. 2. I understand that my anesthesiologist is not an employee or agent of the hospital, but is an independent medical practitioner who has been permitted to use its facilities for the care and treatment of his/her patients. 3. I acknowledge that a physician from [facility] or their designate(s), recommended anesthesia for me using her/his medical judgment. The type(s) of anesthesia I may receive include:                a) General Anesthesia, b) Spinal/Epidural Anesthesia, c) Regional Anesthesia or d) Monitored Anesthesia Care. 4. If my spinal, regional or monitored anesthesia care (local) is not satisfactory for my comfort, or if my medical condition requires, I consent to the administration of general anesthesia. 5. I am aware that the practice of anesthesiology is not an exact science and that some foreseeable risks or consequences may occur. Some common risks/consequences include sore throat and hoarseness, nausea and vomiting, muscle soreness, backache, damage to the mouth/teeth/vocal cords and eye injury. I understand that more rare but serious potential risks of anesthesia include blood pressure changes, drug reactions, cardiac arrest, brain damage, paralysis or death. These risks apply to whether I have general, spinal/epidural, regional or monitored anesthesia care. 6. OBSTETRIC PATIENTS: Specific risks/consequences of spinal/epidural anesthesia may include itching, low blood pressure, difficulty urinating, slowing of the baby's heart rate and headache.  Rare risks include infections, high spinal block, spinal bleeding, seizure, cardiac arrest and death. 7. AWARENESS: I understand that it is possible (but unlikely) to have explicit memory of events from the operating room while under general anesthesia. 8. ELECTROCONVULSIVE THERAPY PATIENTS: This consent serves for all treatments in a single course of therapy. 9. I understand that I must inform my anesthesiologist when I last ate and/or drank to minimize the risk of anesthesia. 10. If I am pregnant, or may pregnant, I understand that elective surgery should be postponed until after the baby is born. Anesthetics cross the placenta and may temporarily anesthetize the baby. Although fetal complications of anesthesia during pregnancy are rare, they may include birth defects, premature labor, brain damage and death. 11. I certify that I informed the anesthesiologist, to the best of my ability, about medication I take including blood thinners, anticoagulants, herbal remedies, narcotics and recreational drugs (e.g. cocaine, marijuana, PCP). Failure to inform my anesthesiologist about these medicines may increase my risk of anesthetic complications. The nature and purpose of my anesthetic management was explained to me. I had the opportunity to ask questions and the answers and information provided meet my satisfaction.   I retain the right to withdraw this consent at any time prior to the administration of said anesthetic.    ___________________________________________________           _____________________________________________________  Patient Signature                                                                                      Witness Signature                ___________________________________________________           _____________________________________________________  Date/Time                                                                                               Responsible person in case of minor/ unconscious pt /Relationship    My signature below affirms that prior to the time of the procedure, I have explained to the patient and/or his/her guardian, the risks and benefits of undergoing anesthesia, as well as any reasonable alternatives.     ___________________________________________________            _____________________________________________________  Physician Signature                            Date/Time  Patient Name: Mitzi Vivar     : 1955     Printed: 2021      Medical Record #: [id number]                              Page 1 of 1    ----------ANESTHESIA CONSENT----------

## (undated) NOTE — LETTER
5/8/2025          Erik Brewer        [address]        [facility] [address]         Dear Erik,    This letter is to inform you that our office has made several attempts to reach you by phone without success.  We were attempting to contact you by phone regarding scheduling your next colonoscopy.     Please contact our office at the number listed below as you receive this letter. Thank you for your cooperation.        Sincerely,    Phu Chen MD  [facility], [address]  [facility] [address]  [phone]

## (undated) NOTE — LETTER
No referring provider defined for this encounter.        10/28/19        Patient: Mika Ba   YOB: 1955   Date of Visit: 10/28/2019       Dear  Dr. Reuben Valdez MD,      I evaluated   Mika Ba in the office today, for oral antiandrogens, and also observation therapy. I also explained the preparations necessary to undergo these treatments and also the post treatment recovery for all of these options.   I also explained  the reported success rates of these treatment optio I gave patient phone numbers and addresses of the above physicians, and I make arrangements for my staff to forward appropriate urology records to the above physicians    Document electronically generated by:  Rose Del Rio

## (undated) NOTE — LETTER
Nelsy Ojeda [address], [place], [place]  [address]  INFORMED CONSENT FOR TRANSFUSION OF BLOOD OR BLOOD PRODUCTS  My physician has informed me of the nature, purpose, benefits and risks of transfusion for blood and blood components that he/she may deem necessary during my treatment or hospitalization. He/she has also discussed alternatives to receiving blood from the voluntary blood supply with me, such as self-donation (autologous) and directed donation (blood donated by family or friends to be used specifically for me). I further understand that while the [address] [place] [address] will attempt to supply any autologous or directed donor blood prior to transfusion of blood from the routine blood supply, medical circumstances may require that other or additional blood components may be required for my care. In giving consent, I acknowledge that my physician has also informed me that despite careful screening and testing in accordance with national and regional regulations, there is still a small risk of transmission of infectious agents including hepatitis, HIV-1/2, cytomegalovirus and other viruses or diseases as yet unknown for which licensed definitive screening tests do not currently exist. Additionally, my physician has informed me of the potential for transfusion reactions not related to an infectious agent. [  ]  Check here for Recurring Outpatient Transfusion Therapy (valid for 1 year) In addition to the above, my physician has informed me that I shall receive numerous transfusions over a period of time and that these can lead to other increased risks. I hereby authorize the transfusion of blood and/or blood products to me as deemed necessary and ordered by physicians participating in my care.  My physician has given me the opportunity to ask questions and any questions asked have been answered to my satisfaction  __________________________________________ ______________________________________________  (Signature of Patient)                                                            (Responsible party in case of Minor,                                                                                                 Incompetent, or unconscious Patient)  ___________________________________________       ________ ______________________________________  (Relationship to Patient)                                                       (Signature of Witness)  ______________________________________________________________________________________________   (Date)                                                                           (Time)  REFUSAL OF CONSENT FOR BLOOD TRANSFUSIONS   Sign only if Refusing   [  ] I understand refusal of blood or blood products as deemed necessary by my physician may have serious consequences to my condition to include possible death.  I hereby assume responsibility for my refusal and release the hospital, its personnel, and my physicians from any responsibility for the consequences of my refusal.    ________________________________________________________________________________  (Signature of Patient)                                                         (Responsible Party/Relationship to Patient)    ________________________________________________________________________________  (Signature of Witness)                                                       (Date/Time)     Patient Name: Nanci Foster     : 1955                 Printed: 2021      Medical Record #: [id number]                                 Page 1 of 1